# Patient Record
Sex: FEMALE | Race: BLACK OR AFRICAN AMERICAN | Employment: FULL TIME | URBAN - METROPOLITAN AREA
[De-identification: names, ages, dates, MRNs, and addresses within clinical notes are randomized per-mention and may not be internally consistent; named-entity substitution may affect disease eponyms.]

---

## 2019-02-19 ENCOUNTER — HOSPITAL ENCOUNTER (EMERGENCY)
Age: 24
Discharge: HOME | End: 2019-02-19
Attending: EMERGENCY MEDICINE
Payer: COMMERCIAL

## 2019-02-19 ENCOUNTER — APPOINTMENT (OUTPATIENT)
Dept: GENERAL RADIOLOGY | Age: 24
End: 2019-02-19
Payer: COMMERCIAL

## 2019-02-19 VITALS
HEIGHT: 67 IN | OXYGEN SATURATION: 100 % | HEART RATE: 113 BPM | DIASTOLIC BLOOD PRESSURE: 71 MMHG | WEIGHT: 200.6 LBS | BODY MASS INDEX: 31.48 KG/M2 | SYSTOLIC BLOOD PRESSURE: 142 MMHG | RESPIRATION RATE: 16 BRPM | TEMPERATURE: 100.2 F

## 2019-02-19 DIAGNOSIS — J10.1 INFLUENZA A: Primary | ICD-10-CM

## 2019-02-19 DIAGNOSIS — M79.10 MYALGIA: ICD-10-CM

## 2019-02-19 DIAGNOSIS — R05.9 COUGH: ICD-10-CM

## 2019-02-19 LAB
RAPID INFLUENZA  B AGN: NEGATIVE
RAPID INFLUENZA A AGN: POSITIVE
S PYO AG THROAT QL: NEGATIVE

## 2019-02-19 PROCEDURE — 71046 X-RAY EXAM CHEST 2 VIEWS: CPT

## 2019-02-19 PROCEDURE — 87081 CULTURE SCREEN ONLY: CPT

## 2019-02-19 PROCEDURE — 6370000000 HC RX 637 (ALT 250 FOR IP): Performed by: EMERGENCY MEDICINE

## 2019-02-19 PROCEDURE — 87880 STREP A ASSAY W/OPTIC: CPT

## 2019-02-19 PROCEDURE — 99283 EMERGENCY DEPT VISIT LOW MDM: CPT

## 2019-02-19 PROCEDURE — 87804 INFLUENZA ASSAY W/OPTIC: CPT

## 2019-02-19 RX ORDER — OSELTAMIVIR PHOSPHATE 75 MG/1
75 CAPSULE ORAL ONCE
Status: DISCONTINUED | OUTPATIENT
Start: 2019-02-19 | End: 2019-02-19 | Stop reason: HOSPADM

## 2019-02-19 RX ORDER — IBUPROFEN 600 MG/1
600 TABLET ORAL EVERY 6 HOURS PRN
Qty: 30 TABLET | Refills: 0 | Status: SHIPPED | OUTPATIENT
Start: 2019-02-19 | End: 2019-06-15 | Stop reason: ALTCHOICE

## 2019-02-19 RX ORDER — IBUPROFEN 400 MG/1
800 TABLET ORAL ONCE
Status: COMPLETED | OUTPATIENT
Start: 2019-02-19 | End: 2019-02-19

## 2019-02-19 RX ORDER — OSELTAMIVIR PHOSPHATE 75 MG/1
75 CAPSULE ORAL 2 TIMES DAILY
Qty: 10 CAPSULE | Refills: 0 | Status: SHIPPED | OUTPATIENT
Start: 2019-02-19 | End: 2019-02-24

## 2019-02-19 RX ADMIN — IBUPROFEN 800 MG: 400 TABLET ORAL at 09:41

## 2019-02-19 ASSESSMENT — PAIN DESCRIPTION - DESCRIPTORS: DESCRIPTORS: ACHING

## 2019-02-19 ASSESSMENT — PAIN SCALES - GENERAL: PAINLEVEL_OUTOF10: 5

## 2019-02-19 ASSESSMENT — PAIN DESCRIPTION - LOCATION: LOCATION: BACK

## 2019-02-21 ENCOUNTER — HOSPITAL ENCOUNTER (EMERGENCY)
Age: 24
Discharge: HOME | End: 2019-02-21
Attending: EMERGENCY MEDICINE
Payer: COMMERCIAL

## 2019-02-21 VITALS
TEMPERATURE: 98.5 F | WEIGHT: 201.4 LBS | HEART RATE: 93 BPM | RESPIRATION RATE: 16 BRPM | SYSTOLIC BLOOD PRESSURE: 131 MMHG | BODY MASS INDEX: 31.61 KG/M2 | DIASTOLIC BLOOD PRESSURE: 85 MMHG | OXYGEN SATURATION: 100 % | HEIGHT: 67 IN

## 2019-02-21 DIAGNOSIS — N34.2 URETHRITIS: Primary | ICD-10-CM

## 2019-02-21 DIAGNOSIS — R30.0 DYSURIA: ICD-10-CM

## 2019-02-21 LAB
BACTERIA: ABNORMAL /HPF
BILIRUBIN URINE: NEGATIVE
BLOOD, URINE: NEGATIVE
CLARITY: ABNORMAL
COLOR: YELLOW
EPITHELIAL CELLS, UA: ABNORMAL /HPF
GLUCOSE URINE: NEGATIVE MG/DL
HCG(URINE) PREGNANCY TEST: NEGATIVE
KETONES, URINE: NEGATIVE MG/DL
LEUKOCYTE ESTERASE, URINE: ABNORMAL
MICROSCOPIC EXAMINATION: YES
NITRITE, URINE: NEGATIVE
PH UA: 5.5
PROTEIN UA: NEGATIVE MG/DL
RBC UA: ABNORMAL /HPF (ref 0–2)
S PYO THROAT QL CULT: NORMAL
SPECIFIC GRAVITY UA: >=1.03
URINE TYPE: ABNORMAL
UROBILINOGEN, URINE: 1 E.U./DL
WBC UA: ABNORMAL /HPF (ref 0–5)
YEAST: PRESENT /HPF

## 2019-02-21 PROCEDURE — 81001 URINALYSIS AUTO W/SCOPE: CPT

## 2019-02-21 PROCEDURE — 99283 EMERGENCY DEPT VISIT LOW MDM: CPT

## 2019-02-21 PROCEDURE — 84703 CHORIONIC GONADOTROPIN ASSAY: CPT

## 2019-02-21 RX ORDER — NITROFURANTOIN 25; 75 MG/1; MG/1
100 CAPSULE ORAL 2 TIMES DAILY
Qty: 10 CAPSULE | Refills: 0 | Status: SHIPPED | OUTPATIENT
Start: 2019-02-21 | End: 2019-02-26

## 2019-02-21 ASSESSMENT — PAIN DESCRIPTION - FREQUENCY: FREQUENCY: CONTINUOUS

## 2019-02-21 ASSESSMENT — PAIN DESCRIPTION - PAIN TYPE: TYPE: ACUTE PAIN

## 2019-02-21 ASSESSMENT — PAIN SCALES - GENERAL: PAINLEVEL_OUTOF10: 7

## 2019-02-21 ASSESSMENT — PAIN DESCRIPTION - LOCATION: LOCATION: PELVIS

## 2019-02-21 ASSESSMENT — PAIN DESCRIPTION - DESCRIPTORS: DESCRIPTORS: ACHING;THROBBING

## 2019-06-09 ENCOUNTER — HOSPITAL ENCOUNTER (EMERGENCY)
Age: 24
Discharge: HOME | End: 2019-06-09
Attending: EMERGENCY MEDICINE
Payer: COMMERCIAL

## 2019-06-09 VITALS
DIASTOLIC BLOOD PRESSURE: 75 MMHG | TEMPERATURE: 98 F | RESPIRATION RATE: 12 BRPM | HEART RATE: 98 BPM | WEIGHT: 203.5 LBS | BODY MASS INDEX: 31.94 KG/M2 | OXYGEN SATURATION: 98 % | HEIGHT: 67 IN | SYSTOLIC BLOOD PRESSURE: 143 MMHG

## 2019-06-09 DIAGNOSIS — N30.00 ACUTE CYSTITIS WITHOUT HEMATURIA: Primary | ICD-10-CM

## 2019-06-09 LAB
AMORPHOUS: ABNORMAL /HPF
BACTERIA: ABNORMAL /HPF
BILIRUBIN URINE: NEGATIVE
BLOOD, URINE: ABNORMAL
CLARITY: ABNORMAL
COLOR: YELLOW
EPITHELIAL CELLS, UA: ABNORMAL /HPF
GLUCOSE URINE: NEGATIVE MG/DL
HCG(URINE) PREGNANCY TEST: NEGATIVE
KETONES, URINE: NEGATIVE MG/DL
LEUKOCYTE ESTERASE, URINE: ABNORMAL
MICROSCOPIC EXAMINATION: YES
MUCUS: ABNORMAL /LPF
NITRITE, URINE: NEGATIVE
PH UA: 7 (ref 5–8)
PROTEIN UA: 30 MG/DL
RBC UA: ABNORMAL /HPF (ref 0–2)
SPECIFIC GRAVITY UA: 1.02 (ref 1–1.03)
URINE TYPE: ABNORMAL
UROBILINOGEN, URINE: 0.2 E.U./DL
WBC UA: >100 /HPF (ref 0–5)

## 2019-06-09 PROCEDURE — 84703 CHORIONIC GONADOTROPIN ASSAY: CPT

## 2019-06-09 PROCEDURE — 81001 URINALYSIS AUTO W/SCOPE: CPT

## 2019-06-09 PROCEDURE — 99283 EMERGENCY DEPT VISIT LOW MDM: CPT

## 2019-06-09 PROCEDURE — 6370000000 HC RX 637 (ALT 250 FOR IP): Performed by: EMERGENCY MEDICINE

## 2019-06-09 RX ORDER — NITROFURANTOIN 25; 75 MG/1; MG/1
100 CAPSULE ORAL 2 TIMES DAILY
Qty: 10 CAPSULE | Refills: 0 | Status: SHIPPED | OUTPATIENT
Start: 2019-06-09 | End: 2019-06-12

## 2019-06-09 RX ORDER — PHENAZOPYRIDINE HYDROCHLORIDE 100 MG/1
100 TABLET, FILM COATED ORAL 3 TIMES DAILY PRN
Qty: 6 TABLET | Refills: 0 | Status: SHIPPED | OUTPATIENT
Start: 2019-06-09 | End: 2019-06-12

## 2019-06-09 RX ORDER — NITROFURANTOIN 25; 75 MG/1; MG/1
100 CAPSULE ORAL EVERY 12 HOURS SCHEDULED
Status: DISCONTINUED | OUTPATIENT
Start: 2019-06-09 | End: 2019-06-09 | Stop reason: HOSPADM

## 2019-06-09 RX ADMIN — NITROFURANTOIN MONOHYDRATE/MACROCRYSTALLINE 100 MG: 25; 75 CAPSULE ORAL at 01:53

## 2019-06-09 ASSESSMENT — PAIN DESCRIPTION - PAIN TYPE: TYPE: ACUTE PAIN

## 2019-06-09 ASSESSMENT — PAIN SCALES - GENERAL: PAINLEVEL_OUTOF10: 7

## 2019-06-09 ASSESSMENT — PAIN DESCRIPTION - FREQUENCY: FREQUENCY: CONTINUOUS

## 2019-06-09 ASSESSMENT — PAIN DESCRIPTION - DESCRIPTORS: DESCRIPTORS: THROBBING;ACHING

## 2019-06-09 ASSESSMENT — PAIN DESCRIPTION - LOCATION: LOCATION: BACK

## 2019-06-09 ASSESSMENT — PAIN DESCRIPTION - ONSET: ONSET: ON-GOING

## 2019-06-09 ASSESSMENT — PAIN DESCRIPTION - ORIENTATION: ORIENTATION: LOWER

## 2019-06-09 ASSESSMENT — PAIN DESCRIPTION - PROGRESSION: CLINICAL_PROGRESSION: NOT CHANGED

## 2019-06-09 NOTE — DISCHARGE INSTR - COC
Continuity of Care Form    Patient Name: Emily Garcia   :  1995  MRN:  [id number]    Admit date:  2019  Discharge date:  ***    Code Status Order: No Order   Advance Directives:     Admitting Physician:  No admitting provider for patient encounter. PCP: No primary care provider on file. Discharging Nurse: [facility] Unit/Room#: 15/15  Discharging Unit Phone Number: ***    Emergency Contact:   Extended Emergency Contact Information  Primary Emergency Contact: Jenny Marcus  Address: [address], [address] [place] [address] Phone: [phone]  Relation: Parent  Secondary Emergency Contact: Noone,Else  Relation: Other    Past Surgical History:  Past Surgical History:   Procedure Laterality Date     SECTION         Immunization History: There is no immunization history on file for this patient. Active Problems: There is no problem list on file for this patient. Isolation/Infection:   Isolation          No Isolation            Nurse Assessment:  Last Vital Signs: BP (!) 143/75   Pulse 98   Temp 98 °F (36.7 °C) (Oral)   Resp 12   Ht 5' 7\" (1.702 m)   Wt 92.3 kg (203 lb 8 oz)   LMP 2019 (Approximate)   SpO2 98%   Breastfeeding?  No   BMI 31.87 kg/m²     Last documented pain score (0-10 scale): Pain Level: 7  Last Weight:   Wt Readings from Last 1 Encounters:   19 92.3 kg (203 lb 8 oz)     Mental Status:  {IP PT MENTAL STATUS:30828}    IV Access:  { ARCHIE IV ACCESS:016624117}    Nursing Mobility/ADLs:  Walking   {CHP DME BWWB:694908724}  Transfer  {CHP DME TDGU:956425806}  Bathing  {CHP DME OEHZ:822657800}  Dressing  {CHP DME JIAH:354841472}  Toileting  {CHP DME MDDX:736585097}  Feeding  {CHP DME NVWJ:255385110}  Med Admin  {CHP DME QOVP:658686742}  Med Delivery   { ARCHIE MED Delivery:735322748}    Wound Care Documentation and Therapy:        Elimination:  Continence:   · Bowel: {YES / BZ:40776}  · Bladder: {YES / WV:07425}  Urinary Catheter: {Urinary Catheter:881048955}   Colostomy/Ileostomy/Ileal Conduit: {YES / CW:33834}       Date of Last BM: ***  No intake or output data in the 24 hours ending 19 0155  No intake/output data recorded.     Safety Concerns:     [address] [facility] Safety Concerns:082546488}    Impairments/Disabilities:      [facility] Impairments/Disabilities:222339430}    Nutrition Therapy:  Current Nutrition Therapy:   [facility] Diet List:096164585}    Routes of Feeding: {CHP DME Other Feedings:851484284}  Liquids: {Slp liquid thickness:18068}  Daily Fluid Restriction: {CHP DME Yes amt example:790510315}  Last Modified Barium Swallow with Video (Video Swallowing Test): {Done Not Done BQSA:576331160}    Treatments at the Time of Hospital Discharge:   Respiratory Treatments: ***  Oxygen Therapy:  {Therapy; copd oxygen:61128}  Ventilator:    { CC Vent IDDZ:652788392}    Rehab Therapies: {THERAPEUTIC INTERVENTION:1117368894}  Weight Bearing Status/Restrictions: [address] [place] [address] Weight Bearin}  Other Medical Equipment (for information only, NOT a DME order):  {EQUIPMENT:709267515}  Other Treatments: ***    Patient's personal belongings (please select all that are sent with patient):  {P DME Belongings:669594911}    RN SIGNATURE:  {Esignature:967867110}    CASE MANAGEMENT/SOCIAL WORK SECTION    Inpatient Status Date: ***    Readmission Risk Assessment Score:  Readmission Risk              Risk of Unplanned Readmission:        0           Discharging to Facility/ Agency   · Name:   · Address:  · Phone:  · Fax:    Dialysis Facility (if applicable)   · Name:  · Address:  · Dialysis Schedule:  · Phone:  · Fax:    / signature: {Esignature:154791617}    PHYSICIAN SECTION    Prognosis: {Prognosis:2977283222}    Condition at Discharge: [address] Patient Condition:484340766}    Rehab Potential (if transferring to Rehab): {Prognosis:5210874222}    Recommended Labs or Other

## 2019-06-09 NOTE — ED PROVIDER NOTES
[facility]   eMERGENCY dEPARTMENT eNCOUnter      Pt Name: Conchis Green  MRN: [id number]  [place] 1995  Date of evaluation: 2019  Provider: Levon Wang MD  PCP: No primary care provider on file. CHIEF COMPLAINT       Chief Complaint   Patient presents with    Back Pain     pt c/o lower back pain and pressure after urinating x 2 days, pt denies fevers at this time. HISTORY OFPRESENT ILLNESS   (Location/Symptom, Timing/Onset, Context/Setting, Quality, Duration, Modifying Factors,Severity)  Note limiting factors. Conchis Green is a 26 y.o. female  Presents with complaints of low back pain and he feet she feels pressure after she urinates is been going on for 2 days she denies any fever denies any vaginal discharge denies any history of kidney stones denies any nausea or vomiting she rates the pain a 7 out of 10. Nursing Notes were all reviewed and agreed with or any disagreements were addressed  in the HPI. REVIEW OF SYSTEMS    (2-9 systems for level 4, 10 or more for level 5)     Review of Systems    Positives and Pertinent negatives as per HPI. Except as noted above in the ROS, all other systems were reviewed andnegative. PASTMEDICAL HISTORY   History reviewed. No pertinent past medical history. SURGICAL HISTORY       Past Surgical History:   Procedure Laterality Date     SECTION           CURRENT MEDICATIONS       Previous Medications    CRANBERRY-VITAMIN C (AZO CRANBERRY URINARY TRACT PO)    Take by mouth    IBUPROFEN (ADVIL;MOTRIN) 600 MG TABLET    Take 1 tablet by mouth every 6 hours as needed for Pain       ALLERGIES     Patient has no known allergies. FAMILY HISTORY     History reviewed. No pertinent family history.        SOCIAL HISTORY       Social History     Socioeconomic History    Marital status: Single     Spouse name: None    Number of children: None    Years of education: None    Highest education level: None   Occupational History    None   Social Needs    Financial resource strain: None    Food insecurity:     Worry: None     Inability: None    Transportation needs:     Medical: None     Non-medical: None   Tobacco Use    Smoking status: Never Smoker    Smokeless tobacco: Never Used   Substance and Sexual Activity    Alcohol use: Yes     Comment: socially    Drug use: No    Sexual activity: Yes     Partners: Male   Lifestyle    Physical activity:     Days per week: None     Minutes per session: None    Stress: None   Relationships    Social connections:     Talks on phone: None     Gets together: None     Attends Holiness service: None     Active member of club or organization: None     Attends meetings of clubs or organizations: None     Relationship status: None    Intimate partner violence:     Fear of current or ex partner: None     Emotionally abused: None     Physically abused: None     Forced sexual activity: None   Other Topics Concern    None   Social History Narrative    None       SCREENINGS    [place] Coma Scale  Eye Opening: Spontaneous  Best Verbal Response: Oriented  Best Motor Response: Obeys commands  [place] Coma Scale Score: 15 @FLOW(34260961)@      PHYSICAL EXAM    (up to 7 for level 4, 8 or more for level 5)     ED Triage Vitals [06/09/19 0130]   BP Temp Temp Source Pulse Resp SpO2 Height Weight   (!) 143/75 98 °F (36.7 °C) Oral 98 12 98 % 5' 7\" (1.702 m) 203 lb 8 oz (92.3 kg)       Physical Exam      General Appearance:  Alert, cooperative, no distress, appears stated age. Head:  Normocephalic, without obviousabnormality, atraumatic. Eyes:  conjunctiva/corneas clear, EOM's intact. Sclera anicteric. ENT: Mucous membranes moist.   Neck: Supple, symmetrical, trachea midline, no adenopathy. No jugular venous distention. Lungs:   Clear to auscultation bilaterally, respirationsunlabored. No rales, rhonchi or wheezes. Chest Wall:  No tenderness. Heart:  Regular rate and rhythm, S1 and S2 normal, no murmur, rub or gallop. Abdomen:   Soft, non-tender, bowel sounds active,   no masses, no organomegaly. Extremities: No edema, cords or calf tenderness. Full range of motion. Pulses: 2+ and symmetric   Skin: Turgor is normal, no rashes or lesions. Neurologic: Alert and oriented X 3. No focal findings. Motor grossly normal.  Speech clear, no drift, CN III-XII grossly intact,        DIAGNOSTIC RESULTS   LABS:    Labs Reviewed   URINALYSIS - Abnormal; Notable for the following components:       Result Value    Clarity, UA CLOUDY (*)     Blood, Urine SMALL (*)     Protein, UA 30 (*)     Leukocyte Esterase, Urine MODERATE (*)     All other components within normal limits    Narrative:     Performed at:  [facility] [place],  [facility]   Phone [phone]   PREGNANCY, URINE    Narrative:     Performed at:  [facility],  [facility]   Phone [phone]   MICROSCOPIC URINALYSIS       All other labs were within normal range or not returned as of this dictation. EKG: All EKG's are interpreted by the Emergency Department Physician who eithersigns or Co-signs this chart in the absence of a cardiologist.        RADIOLOGY:   Non-plain film images such as CT, Ultrasound and MRI are read by the radiologist. Plain radiographic images are visualized by myself.       *    Interpretation per the Radiologist below, if available at the time of this note:    No orders to display         PROCEDURES   Unless otherwise noted below, none     Procedures    *    CRITICAL CARE TIME   N/A      EMERGENCY DEPARTMENT COURSE and DIFFERENTIALDIAGNOSIS/MDM:   Vitals:    Vitals:    06/09/19 0130   BP: (!) 143/75   Pulse: 98   Resp: 12   Temp: 98 °F (36.7 °C)   TempSrc: Oral   SpO2: 98%   Weight: 92.3 kg (203 lb 8 oz)   Height: 5' 7\" (1.702 m)       Patient was given thefollowing medications:  Medications   nitrofurantoin (macrocrystal-monohydrate) (MACROBID) capsule 100 mg (has no administration in time range)           The patient tolerated their visit well. The patient and / or the familywere informed of the results of any tests, a time was given to answer questions. FINAL IMPRESSION      1.  Acute cystitis without hematuria          DISPOSITION/PLAN   DISPOSITION Decision To Discharge 06/09/2019 01:38:26 AM      PATIENT REFERRED TO:  [facility] AT [address] [address] [phone]      As needed      DISCHARGE MEDICATIONS:  New Prescriptions    NITROFURANTOIN, MACROCRYSTAL-MONOHYDRATE, (MACROBID) 100 MG CAPSULE    Take 1 capsule by mouth 2 times daily for 5 days    PHENAZOPYRIDINE (PYRIDIUM) 100 MG TABLET    Take 1 tablet by mouth 3 times daily as needed for Pain       DISCONTINUED MEDICATIONS:  Discontinued Medications    No medications on file              (Please note that portions of this note were completed with a voice recognition program.  Efforts were made to edit the dictations but occasionally words are mis-transcribed.)    Yuan Jaffe MD (electronically signed)      Yuan Jaffe MD  06/09/19 0140

## 2019-06-12 ENCOUNTER — HOSPITAL ENCOUNTER (EMERGENCY)
Age: 24
Discharge: HOME | End: 2019-06-12
Attending: EMERGENCY MEDICINE
Payer: COMMERCIAL

## 2019-06-12 VITALS
BODY MASS INDEX: 31.81 KG/M2 | OXYGEN SATURATION: 99 % | TEMPERATURE: 98.9 F | HEART RATE: 92 BPM | DIASTOLIC BLOOD PRESSURE: 71 MMHG | SYSTOLIC BLOOD PRESSURE: 116 MMHG | RESPIRATION RATE: 14 BRPM | WEIGHT: 203.1 LBS

## 2019-06-12 DIAGNOSIS — N76.0 BACTERIAL VAGINOSIS: ICD-10-CM

## 2019-06-12 DIAGNOSIS — B96.89 BACTERIAL VAGINOSIS: ICD-10-CM

## 2019-06-12 DIAGNOSIS — N30.00 ACUTE CYSTITIS WITHOUT HEMATURIA: Primary | ICD-10-CM

## 2019-06-12 LAB
BACTERIA WET PREP: ABNORMAL
BACTERIA: ABNORMAL /HPF
BILIRUBIN URINE: NEGATIVE
BLOOD, URINE: NEGATIVE
CLARITY: ABNORMAL
CLUE CELLS: ABNORMAL
COLOR: YELLOW
EPITHELIAL CELLS WET PREP: ABNORMAL
EPITHELIAL CELLS, UA: 24 /HPF (ref 0–5)
GLUCOSE URINE: NEGATIVE MG/DL
HCG(URINE) PREGNANCY TEST: NEGATIVE
HYALINE CASTS: 5 /LPF (ref 0–8)
KETONES, URINE: NEGATIVE MG/DL
LEUKOCYTE ESTERASE, URINE: ABNORMAL
MICROSCOPIC EXAMINATION: YES
NITRITE, URINE: NEGATIVE
PH UA: 7.5 (ref 5–8)
PROTEIN UA: 30 MG/DL
RBC UA: ABNORMAL /HPF (ref 0–2)
RBC WET PREP: ABNORMAL
SOURCE WET PREP: ABNORMAL
SPECIFIC GRAVITY UA: 1.02 (ref 1–1.03)
TRICHOMONAS PREP: ABNORMAL
URINE TYPE: ABNORMAL
UROBILINOGEN, URINE: 1 E.U./DL
WBC UA: 101 /HPF (ref 0–5)
WBC WET PREP: ABNORMAL
YEAST WET PREP: ABNORMAL

## 2019-06-12 PROCEDURE — 81001 URINALYSIS AUTO W/SCOPE: CPT

## 2019-06-12 PROCEDURE — 87210 SMEAR WET MOUNT SALINE/INK: CPT

## 2019-06-12 PROCEDURE — 87491 CHLMYD TRACH DNA AMP PROBE: CPT

## 2019-06-12 PROCEDURE — 87086 URINE CULTURE/COLONY COUNT: CPT

## 2019-06-12 PROCEDURE — 87591 N.GONORRHOEAE DNA AMP PROB: CPT

## 2019-06-12 PROCEDURE — 84703 CHORIONIC GONADOTROPIN ASSAY: CPT

## 2019-06-12 PROCEDURE — 99283 EMERGENCY DEPT VISIT LOW MDM: CPT

## 2019-06-12 PROCEDURE — 87077 CULTURE AEROBIC IDENTIFY: CPT

## 2019-06-12 RX ORDER — METRONIDAZOLE 7.5 MG/G
1 GEL VAGINAL DAILY
Qty: 70 G | Refills: 0 | Status: SHIPPED | OUTPATIENT
Start: 2019-06-12 | End: 2019-06-19

## 2019-06-12 RX ORDER — CEFUROXIME AXETIL 250 MG/1
500 TABLET ORAL 2 TIMES DAILY
Qty: 40 TABLET | Refills: 0 | Status: SHIPPED | OUTPATIENT
Start: 2019-06-12 | End: 2019-06-15 | Stop reason: ALTCHOICE

## 2019-06-12 RX ORDER — METRONIDAZOLE 500 MG/1
500 TABLET ORAL 2 TIMES DAILY
Qty: 14 TABLET | Refills: 0 | Status: SHIPPED | OUTPATIENT
Start: 2019-06-12 | End: 2019-06-12

## 2019-06-12 ASSESSMENT — ENCOUNTER SYMPTOMS
STRIDOR: 0
NAUSEA: 0
COUGH: 0
WHEEZING: 0
ABDOMINAL PAIN: 0
SHORTNESS OF BREATH: 0
VOMITING: 0
BACK PAIN: 0
DIARRHEA: 0
CONSTIPATION: 0
ABDOMINAL DISTENTION: 0
BLOOD IN STOOL: 0
ANAL BLEEDING: 0
COLOR CHANGE: 0
RECTAL PAIN: 0

## 2019-06-12 NOTE — ED PROVIDER NOTES
[facility]        Pt Name: Quintin Domingo  MRN: [id number]  [place] 1995  Date of evaluation: 6/12/2019  Provider: Prabhjot Hawkins PA-C  PCP: No primary care provider on file. This patient was seen and evaluated by the attending physician Nahed Johnson MD.      Davi Lexie       Chief Complaint   Patient presents with    Urinary Tract Infection     burning while urinating with odor afterwards. recently dx with UTI and prescribed medications without improvement       HISTORY OF PRESENT ILLNESS   (Location/Symptom, Timing/Onset, Context/Setting, Quality, Duration, Modifying Factors, Severity)  Note limiting factors. Quintin Domingo is a 26 y.o. female who presents to the emergency department complaining of dysuria, urinary frequency, urgency and foul-smelling vaginal discharge for several days. She went to another emergency department on 6/9/2019 and was diagnosed with UTI. She was placed on Macrobid and Pyridium and states that her symptoms are not getting any better. Denies any abdominal or pelvic pain. Denies likely had a pregnancy. Denies fever, chills, flank pain, hematuria, nausea, vomiting, diarrhea, constipation. She is not terribly concerned about STD. However, has history of bacterial vaginosis and states that this does feel somewhat similar. Nursing Notes were all reviewed and agreed with or any disagreements were addressed  in the HPI. REVIEW OF SYSTEMS    (2-9 systems for level 4, 10 or more for level 5)     Review of Systems   Constitutional: Negative for chills and fever. HENT: Negative. Eyes: Negative for visual disturbance. Respiratory: Negative for cough, shortness of breath, wheezing and stridor. Cardiovascular: Negative for chest pain, palpitations and leg swelling.    Gastrointestinal: Negative for abdominal distention, abdominal pain, anal bleeding, blood in stool, constipation, diarrhea, nausea, rectal pain and vomiting. Genitourinary: Positive for dysuria, frequency, urgency and vaginal discharge. Negative for decreased urine volume, difficulty urinating, flank pain, hematuria, menstrual problem, pelvic pain, vaginal bleeding and vaginal pain. Musculoskeletal: Negative for back pain, neck pain and neck stiffness. Skin: Negative for color change, pallor, rash and wound. Neurological: Negative for dizziness, tremors, seizures, syncope, facial asymmetry, speech difficulty, weakness, light-headedness, numbness and headaches. Psychiatric/Behavioral: Negative for confusion. All other systems reviewed and are negative. Positives and Pertinent negatives as per HPI. Except as noted abovein the ROS, all other systems were reviewed and negative. PAST MEDICAL HISTORY   History reviewed. No pertinent past medical history. SURGICAL HISTORY     Past Surgical History:   Procedure Laterality Date     SECTION           CURRENTMEDICATIONS       Previous Medications    CRANBERRY-VITAMIN C (AZO CRANBERRY URINARY TRACT PO)    Take by mouth    IBUPROFEN (ADVIL;MOTRIN) 600 MG TABLET    Take 1 tablet by mouth every 6 hours as needed for Pain    PHENAZOPYRIDINE (PYRIDIUM) 100 MG TABLET    Take 1 tablet by mouth 3 times daily as needed for Pain         ALLERGIES     Patient has no known allergies. FAMILYHISTORY     History reviewed. No pertinent family history.        SOCIAL HISTORY       Social History     Socioeconomic History    Marital status: Single     Spouse name: None    Number of children: None    Years of education: None    Highest education level: None   Occupational History    None   Social Needs    Financial resource strain: None    Food insecurity:     Worry: None     Inability: None    Transportation needs:     Medical: None     Non-medical: None   Tobacco Use    Smoking status: Never Smoker    Smokeless tobacco: Never Used   Substance and Sexual Activity    Alcohol use: Yes     Comment: socially    Drug use: No    Sexual activity: Yes     Partners: Male   Lifestyle    Physical activity:     Days per week: None     Minutes per session: None    Stress: None   Relationships    Social connections:     Talks on phone: None     Gets together: None     Attends Adventist service: None     Active member of club or organization: None     Attends meetings of clubs or organizations: None     Relationship status: None    Intimate partner violence:     Fear of current or ex partner: None     Emotionally abused: None     Physically abused: None     Forced sexual activity: None   Other Topics Concern    None   Social History Narrative    None       SCREENINGS             PHYSICAL EXAM    (up to 7 for level 4, 8 or more for level 5)     ED Triage Vitals [06/12/19 1727]   BP Temp Temp Source Pulse Resp SpO2 Height Weight   116/71 98.9 °F (37.2 °C) Infrared 92 14 99 % -- 203 lb 1.6 oz (92.1 kg)       Physical Exam   Constitutional: She is oriented to person, place, and time. She appears well-developed and well-nourished. No distress. HENT:   Head: Normocephalic and atraumatic. Right Ear: External ear normal.   Left Ear: External ear normal.   Nose: Nose normal.   Mouth/Throat: Oropharynx is clear and moist.   Eyes: Pupils are equal, round, and reactive to light. Conjunctivae and EOM are normal. Right eye exhibits no discharge. Left eye exhibits no discharge. Neck: Normal range of motion. No JVD present. Cardiovascular: Normal rate. Pulmonary/Chest: Effort normal and breath sounds normal.   Abdominal: Soft. Bowel sounds are normal. She exhibits no abdominal bruit and no pulsatile midline mass. There is no tenderness. There is no rigidity, no rebound, no guarding, no CVA tenderness, no tenderness at McBurney's point and negative Finch's sign. Genitourinary:   Genitourinary Comments: Prefers self swab   Musculoskeletal: Normal range of motion. Neurological: She is alert and oriented to person, place, and time. Skin: Skin is warm and dry. Capillary refill takes less than 2 seconds. No rash noted. She is not diaphoretic. No erythema. No pallor. Psychiatric: She has a normal mood and affect. Her behavior is normal.   Nursing note and vitals reviewed. DIAGNOSTIC RESULTS   LABS:    Labs Reviewed   WET PREP, GENITAL - Abnormal; Notable for the following components:       Result Value    Clue Cells, Wet Prep 1+ (*)     All other components within normal limits    Narrative:     Performed at:  [facility] [address], [facility], [address]   Phone [phone]   URINALYSIS - Abnormal; Notable for the following components:    Clarity, UA CLOUDY (*)     Protein, UA 30 (*)     Leukocyte Esterase, Urine MODERATE (*)     All other components within normal limits    Narrative:     Performed at:  [facility] [address], [facility], [facility] [address]   Phone [phone]   MICROSCOPIC URINALYSIS - Abnormal; Notable for the following components:    Bacteria, UA 3+ (*)     WBC,  (*)     Epi Cells 24 (*)     All other components within normal limits    Narrative:     Performed at:  [facility] [address], [facility], [facility] [address]   Phone [phone]   URINE CULTURE   C.TRACHOMATIS N.GONORRHOEAE DNA   PREGNANCY, URINE    Narrative:     Performed at:  [facility] [address], [facility], [facility] [address]   Phone [phone]       All other labs were within normal range or not returned as of this dictation. EKG: All EKG's are interpreted by the Emergency Department Physician who either signs orCo-signs this chart in the absence of a cardiologist.  Please see their note for interpretation of EKG.       RADIOLOGY:   Non-plain film images such as CT, Ultrasound and MRI are read by the radiologist. Plain radiographic images are visualized andpreliminarily interpreted by the  ED Provider with the below findings:        Interpretation perthe Radiologist below, if available at the time of this note:    No orders to display     No results found. PROCEDURES   Unless otherwise noted below, none     Procedures    CRITICAL CARE TIME   N/A    CONSULTS:  None      EMERGENCY DEPARTMENT COURSE and DIFFERENTIALDIAGNOSIS/MDM:   Vitals:    Vitals:    06/12/19 1727   BP: 116/71   Pulse: 92   Resp: 14   Temp: 98.9 °F (37.2 °C)   TempSrc: Infrared   SpO2: 99%   Weight: 203 lb 1.6 oz (92.1 kg)       Patient was given thefollowing medications:  Medications - No data to display    This patient presents complaining of acute urinary symptoms and vaginal discharge. She declines pelvic exam and prefers self swab. Abdomen soft nontender in all 4 quadrants without pulsatile mass or CVA tenderness. Wet prep shows evidence of bacterial vaginosis. Declines STD treatment at this time. Urinalysis suggest infections. Pregnancy test negative. We will change the antibiotic, Macrobid, to Ceftin and add Flagyl (she prefers topical ) to treat BV. Advised to follow-up with PCP and gynecologist for recheck and may return to ED per discharge instructions. She was given strict return precautions. She was counseled on safe sex for at least 3 minutes. My suspicion is low for acute surgical abdomen, obstruction, perforation, abscess, mesenteric ischemia, AAA, dissection, cholecystitis, cholangitis, pancreatitis, appendicitis, C. diff colitis, diverticulitis, volvulus, incarcerated hernia, necrotizing fasciitis, TOA, ovarian torsion, PID, ectopic pregnancy, angeline beena Porfirio syndrome,  pyelonephritis, perinephric abscess, kidney stone, urosepsis, fistula or other concerning pathology. We have addressed her concerns and expectations. FINAL IMPRESSION      1. Acute cystitis without hematuria    2.  Bacterial vaginosis          DISPOSITION/PLAN   DISPOSITION Decision To Discharge 06/12/2019 06:10:53 PM      PATIENT REFERREDTO:  [facility] Pre-Services  [place] [address] Emergency Department  [address] [facility]  [phone]    If symptoms worsen    [address]  [address] [facility] [address] [facility] [address] [facility]          DISCHARGE MEDICATIONS:  New Prescriptions    CEFUROXIME (CEFTIN) 250 MG TABLET    Take 2 tablets by mouth 2 times daily for 10 days    METRONIDAZOLE (METROGEL) 0.75 % VAGINAL GEL    Place 1 Applicatorful vaginally daily for 7 days       DISCONTINUED MEDICATIONS:  Discontinued Medications    NITROFURANTOIN, MACROCRYSTAL-MONOHYDRATE, (MACROBID) 100 MG CAPSULE    Take 1 capsule by mouth 2 times daily for 5 days              (Please note that portions ofthis note were completed with a voice recognition program.  Efforts were made to edit the dictations but occasionally words are mis-transcribed.)    Birdie Hernandez PA-C (electronically signed)           Birdie Hernandez PA-C  06/12/19 [address] [place], [place] [address]DALIA  06/12/19 Precious Maldonado

## 2019-06-12 NOTE — LETTER
[facility] Emergency Department  [address] [facility] [address]  Phone: [phone]               June 12, 2019    Patient: Isiah Amaro   YOB: 1995   Date of Visit: 6/12/2019       To Whom It May Concern:    Millicent Escoto was seen and treated in our emergency department on 6/12/2019. She may return to work on 6/15/2019.       Sincerely,       Cayla Mensah RN         Signature:__________________________________

## 2019-06-12 NOTE — ED PROVIDER NOTES
This patient was seen by the Mid-Level Provider. I have seen and examined the patient, agree with the workup, evaluation, management and diagnosis. Care plan has been discussed. My assessment reveals a 52-year-old female with painful urination. Patient presents with some painful urination. She also complains of some vaginal discharge and odor. Exam: Examination was unremarkable. Her abdomen is soft and benign. Work-up consistent with a urinalysis that is positive for infection and bacterial vaginosis. Disposition: She will be discharged with a full course of antibiotics and Flagyl for her bacterial vaginosis. She was given referral.  She is to return if worse. Results for orders placed or performed during the hospital encounter of 06/12/19   Wet prep, genital   Result Value Ref Range    Trichomonas Prep None Seen     Yeast, Wet Prep None Seen     Clue Cells, Wet Prep 1+ (A)     WBC, Wet Prep None Seen     RBC, Wet Prep None Seen     Epi Cells 3+     Bacteria 4+     Source Wet Prep Vaginal    Urinalysis   Result Value Ref Range    Color, UA YELLOW Straw/Yellow    Clarity, UA CLOUDY (A) Clear    Glucose, Ur Negative Negative mg/dL    Bilirubin Urine Negative Negative    Ketones, Urine Negative Negative mg/dL    Specific Gravity, UA 1.024 1.005 - 1.030    Blood, Urine Negative Negative    pH, UA 7.5 5.0 - 8.0    Protein, UA 30 (A) Negative mg/dL    Urobilinogen, Urine 1.0 <2.0 E.U./dL    Nitrite, Urine Negative Negative    Leukocyte Esterase, Urine MODERATE (A) Negative    Microscopic Examination YES     Urine Type Not Specified    Pregnancy, Urine   Result Value Ref Range    HCG(Urine) Pregnancy Test Negative Detects HCG level >20 MIU/mL   Microscopic Urinalysis   Result Value Ref Range    RBC, UA None seen 0 - 2 /HPF    Bacteria, UA 3+ (A) /HPF    Hyaline Casts, UA 5 0 - 8 /LPF    WBC,  (H) 0 - 5 /HPF    Epi Cells 24 (H) 0 - 5 /HPF     No results found.         Laine Rosen MD  06/12/19 [address] [place]

## 2019-06-13 LAB
C TRACH DNA GENITAL QL NAA+PROBE: NEGATIVE
N. GONORRHOEAE DNA: NEGATIVE

## 2019-06-14 LAB
ORGANISM: ABNORMAL
URINE CULTURE, ROUTINE: ABNORMAL
URINE CULTURE, ROUTINE: ABNORMAL

## 2019-06-15 ENCOUNTER — HOSPITAL ENCOUNTER (EMERGENCY)
Age: 24
Discharge: HOME | End: 2019-06-15
Attending: EMERGENCY MEDICINE
Payer: COMMERCIAL

## 2019-06-15 VITALS
OXYGEN SATURATION: 100 % | RESPIRATION RATE: 16 BRPM | TEMPERATURE: 99 F | DIASTOLIC BLOOD PRESSURE: 81 MMHG | HEART RATE: 81 BPM | WEIGHT: 203 LBS | BODY MASS INDEX: 31.79 KG/M2 | SYSTOLIC BLOOD PRESSURE: 134 MMHG

## 2019-06-15 DIAGNOSIS — N30.00 ACUTE CYSTITIS WITHOUT HEMATURIA: Primary | ICD-10-CM

## 2019-06-15 LAB
AMORPHOUS: ABNORMAL /HPF
BACTERIA: ABNORMAL /HPF
BILIRUBIN URINE: NEGATIVE
BLOOD, URINE: ABNORMAL
CLARITY: ABNORMAL
COLOR: YELLOW
EPITHELIAL CELLS, UA: ABNORMAL /HPF
GLUCOSE URINE: NEGATIVE MG/DL
KETONES, URINE: NEGATIVE MG/DL
LEUKOCYTE ESTERASE, URINE: ABNORMAL
MICROSCOPIC EXAMINATION: YES
MUCUS: ABNORMAL /LPF
NITRITE, URINE: NEGATIVE
PH UA: 7.5 (ref 5–8)
PROTEIN UA: 30 MG/DL
RAPID INFLUENZA  B AGN: NEGATIVE
RAPID INFLUENZA A AGN: NEGATIVE
RBC UA: ABNORMAL /HPF (ref 0–2)
SPECIFIC GRAVITY UA: 1.02 (ref 1–1.03)
URINE TYPE: ABNORMAL
UROBILINOGEN, URINE: 1 E.U./DL
WBC UA: >100 /HPF (ref 0–5)

## 2019-06-15 PROCEDURE — 87086 URINE CULTURE/COLONY COUNT: CPT

## 2019-06-15 PROCEDURE — 87804 INFLUENZA ASSAY W/OPTIC: CPT

## 2019-06-15 PROCEDURE — 81001 URINALYSIS AUTO W/SCOPE: CPT

## 2019-06-15 PROCEDURE — 99283 EMERGENCY DEPT VISIT LOW MDM: CPT

## 2019-06-15 RX ORDER — LEVOFLOXACIN 750 MG/1
750 TABLET ORAL DAILY
Qty: 5 TABLET | Refills: 0 | Status: SHIPPED | OUTPATIENT
Start: 2019-06-15 | End: 2019-06-20

## 2019-06-15 RX ORDER — DICLOFENAC SODIUM 75 MG/1
75 TABLET, DELAYED RELEASE ORAL 2 TIMES DAILY PRN
Qty: 10 TABLET | Refills: 0 | Status: SHIPPED | OUTPATIENT
Start: 2019-06-15 | End: 2021-01-08

## 2019-06-15 ASSESSMENT — PAIN SCALES - GENERAL: PAINLEVEL_OUTOF10: 8

## 2019-06-15 ASSESSMENT — PAIN DESCRIPTION - LOCATION: LOCATION: GENERALIZED

## 2019-06-15 ASSESSMENT — PAIN DESCRIPTION - PAIN TYPE: TYPE: ACUTE PAIN

## 2019-06-15 ASSESSMENT — PAIN DESCRIPTION - DESCRIPTORS: DESCRIPTORS: ACHING

## 2019-06-16 NOTE — ED PROVIDER NOTES
Emergency Department Provider Note  Location: [address] [place] [address]  6/15/2019     Patient Identification  Isiah Amaro is a 26 y.o. female    Chief Complaint  Fever (c/o Generalized Aches with Fever that started about 2 hours ago. Slight Cough tonight 30 min ago. Denies being around anyone who has been ill. )      Mode of Arrival  private car    HPI  (History provided by patient)  This is a 26 y.o. female without relevant past medical history presented today for subjective fever, generalized body ache that started 2 days ago. Patient is currently taking Ceftin for UTI that was diagnosed 3 days ago. Patient also states she does not feel better. She states she continues had dysuria and felt like her dysuria has gotten worse. She denies flank pain. No nausea or vomiting. She also developed a cough tonight. She started feeling some congestion as well. Denies sick contact. No foreign travel. She has not taken any medication for symptoms. No nausea, vomiting, or diarrhea. Patient states she feels like having a flu. She was diagnosed with flu in February of this year. She is requesting a flu swab because she has a 11 month old. ROS  5 systems reviewed, pertinent positives per HPI otherwise noted to be negative    I have reviewed the following nursing documentation:  Allergies: No Known Allergies    Past medical history:  has no past medical history on file. Past surgical history:  has a past surgical history that includes  section. Home medications:   Prior to Admission medications    Medication Sig Start Date End Date Taking? Authorizing Provider   metroNIDAZOLE (METROGEL) 0.75 % vaginal gel Place 1 Applicatorful vaginally daily for 7 days 19 Yes Jamaal Wilson PA-C   Cranberry-Vitamin C (AZO CRANBERRY URINARY TRACT PO) Take by mouth   Yes Historical Provider, MD       Social history:  reports that she has never smoked.  She has never used smokeless tobacco. She reports that she drinks alcohol. She reports that she does not use drugs. Family history:  History reviewed. No pertinent family history. Exam  ED Triage Vitals [06/15/19 2228]   BP Temp Temp Source Pulse Resp SpO2 Height Weight   134/81 99 °F (37.2 °C) Oral 81 16 100 % -- 203 lb (92.1 kg)   Nursing note and vital signs reviewed. Constitutional: Patient is oriented to person, place, and time. WDWN. No distress. Nontoxic. HENT:      Head: Normocephalic and atraumatic. Ears: External ears normal. TM normal bilaterally. Nose: Nose normal.     Mouth: Membrane mucosa moist and pink. Uvula midline. Soft palate symmetrical.  No evidence of PTA. No tonsillar exudate. No trismus. No submandibular fullness or tongue elevation. Eyes: Anicteric sclera. PERRL. EOMI. No discharge. Neck: Supple. Trachea midline. Good ROM. No meningismus signs. No mass. Lymph: No cervical adenopathy  Cardiovascular: RRR, no g/r/m. 2+ radial pulses. Pulmonary/Chest: Effort normal. No respiratory distress. CTAB. No stridor. No wheezes. No rales. Musculoskeletal: No extremity edema. Compartments soft. No deformity. Neurological: Alert and oriented to person, place, and time. No facial droop. No slurred speech. Normal gait. Skin: Warm and dry. No rash. No petechiae.         MDM/ED Course  Labs  Results for orders placed or performed during the hospital encounter of 06/15/19   Rapid Flu Swab   Result Value Ref Range    Rapid Influenza A Ag Negative Negative    Rapid Influenza B Ag Negative Negative   Urinalysis, reflex to microscopic   Result Value Ref Range    Color, UA Yellow Straw/Yellow    Clarity, UA SL CLOUDY (A) Clear    Glucose, Ur Negative Negative mg/dL    Bilirubin Urine Negative Negative    Ketones, Urine Negative Negative mg/dL    Specific Gravity, UA 1.020 1.005 - 1.030    Blood, Urine TRACE-INTACT (A) Negative    pH, UA 7.5 5.0 - 8.0    Protein, UA 30 (A) Negative mg/dL Urobilinogen, Urine 1.0 <2.0 E.U./dL    Nitrite, Urine Negative Negative    Leukocyte Esterase, Urine MODERATE (A) Negative    Microscopic Examination YES     Urine Type Not Specified    Microscopic Urinalysis   Result Value Ref Range    Mucus, UA 1+ (A) /LPF    WBC, UA >100 (A) 0 - 5 /HPF    RBC, UA 0-2 0 - 2 /HPF    Epi Cells 10-20 /HPF    Bacteria, UA 2+ (A) /HPF    Amorphous, UA 2+ (A) /HPF       - Patient seen and evaluated in room 1.  26 y.o. female presented for subjective fever and body ache that started 2 hours ago. She is currently being treated for acute cystitis but reports worsening symptoms so we did agreed to repeat her urinalysis. Her UA shows show persistent UTI despite being on Ceftin for 2 full days now. No influenza. Patient otherwise was very well-appearing without signs of toxicity. We agreed to switch her antibiotics for UTI. She states she has had Macrobid before and it also does not help her UTI. I review her culture from 3 days ago. It grew Staphylococcus saprophyticus and sensitivity not tested due to no known resistance. - Diagnostic studies reviewed and results discussed with patient. We agreed to switch her antibiotics to Levaquin.  - Return precautions also discussed. patient verbalized understanding of care plan and agreed to follow-up with PCP as advised. I estimate there is LOW risk for EPIGLOTTITIS, PNEUMONIA, MENINGITIS, OR URINARY TRACT INFECTION, thus I consider the discharge disposition reasonable. Also, there is no evidence or peritonitis, sepsis, or toxicity. Jabari Lau and I have discussed the diagnosis and risks, and we agree with discharging home to follow-up with their primary doctor. We also discussed returning to the Emergency Department immediately if new or worsening symptoms occur.  We have discussed the symptoms which are most concerning (e.g., changing or worsening pain, trouble swallowing or breating, neck stiffness, fever) that necessitate immediate return. Clinical Impression:  1. Acute cystitis without hematuria        Disposition:  Discharge to home in good condition. Blood pressure 134/81, pulse 81, temperature 99 °F (37.2 °C), temperature source Oral, resp. rate 16, weight 92.1 kg (203 lb), last menstrual period 05/20/2019, SpO2 100 %, not currently breastfeeding. Patient was given scripts for the following medications. I counseled patient how to take these medications. Discharge Medication List as of 6/15/2019 11:15 PM      START taking these medications    Details   levofloxacin (LEVAQUIN) 750 MG tablet Take 1 tablet by mouth daily for 5 days, Disp-5 tablet, R-0Print      diclofenac (VOLTAREN) 75 MG EC tablet Take 1 tablet by mouth 2 times daily as needed for Pain, Disp-10 tablet, R-0Print             Disposition referral (if applicable):  [facility] AT [address]    Schedule an appointment as soon as possible for a visit   This is our primary care clinic    This chart was generated in part by using Dragon Dictation system and may contain errors related to that system including errors in grammar, punctuation, and spelling, as well as words and phrases that may be inappropriate. If there are any questions or concerns please feel free to contact the dictating provider for clarification.      Masoud Gonzalez MD  [facility] Karmen Duran MD  06/16/19 3429

## 2019-06-17 LAB — URINE CULTURE, ROUTINE: NORMAL

## 2019-07-29 ENCOUNTER — HOSPITAL ENCOUNTER (EMERGENCY)
Age: 24
Discharge: HOME | End: 2019-07-29
Attending: EMERGENCY MEDICINE
Payer: COMMERCIAL

## 2019-07-29 VITALS
OXYGEN SATURATION: 100 % | TEMPERATURE: 97.3 F | HEIGHT: 67 IN | WEIGHT: 198.44 LBS | RESPIRATION RATE: 14 BRPM | DIASTOLIC BLOOD PRESSURE: 84 MMHG | BODY MASS INDEX: 31.15 KG/M2 | SYSTOLIC BLOOD PRESSURE: 132 MMHG | HEART RATE: 94 BPM

## 2019-07-29 DIAGNOSIS — R11.2 NON-INTRACTABLE VOMITING WITH NAUSEA, UNSPECIFIED VOMITING TYPE: Primary | ICD-10-CM

## 2019-07-29 PROCEDURE — 6370000000 HC RX 637 (ALT 250 FOR IP): Performed by: EMERGENCY MEDICINE

## 2019-07-29 PROCEDURE — 99283 EMERGENCY DEPT VISIT LOW MDM: CPT

## 2019-07-29 RX ORDER — ONDANSETRON 4 MG/1
4 TABLET, ORALLY DISINTEGRATING ORAL EVERY 8 HOURS PRN
Qty: 12 TABLET | Refills: 1 | Status: SHIPPED | OUTPATIENT
Start: 2019-07-29 | End: 2021-01-08

## 2019-07-29 RX ORDER — ONDANSETRON 4 MG/1
4 TABLET, ORALLY DISINTEGRATING ORAL ONCE
Status: COMPLETED | OUTPATIENT
Start: 2019-07-29 | End: 2019-07-29

## 2019-07-29 RX ADMIN — ONDANSETRON 4 MG: 4 TABLET, ORALLY DISINTEGRATING ORAL at 13:20

## 2019-07-29 NOTE — ED PROVIDER NOTES
TRIAGE CHIEF COMPLAINT:   Chief Complaint   Patient presents with    Emesis         HPI: Candy Moya is a 22 y.o. female who presents to the Emergency Department with complaint of nausea and vomiting that started yesterday. She last vomited early this morning. She states she is feeling hungry. Denies diarrhea or abdominal pain. No fever or chills. She has no UTI symptoms or flank pain. Denies cough or URI symptoms. No sore throat or earache. She states her child has recently had some diarrhea. REVIEW OF SYSTEMS:  6 systems reviewed. Pertinent positives per HPI. Otherwise noted to be negative. Nursing notes reviewed and agree with above. Past medical/surgical history reviewed. MEDICATIONS   Patient's Medications   New Prescriptions    ONDANSETRON (ZOFRAN ODT) 4 MG DISINTEGRATING TABLET    Take 1 tablet by mouth every 8 hours as needed for Nausea or Vomiting May Sub regular tablet (non-ODT) if insurance does not cover ODT. Previous Medications    CRANBERRY-VITAMIN C (AZO CRANBERRY URINARY TRACT PO)    Take by mouth    DICLOFENAC (VOLTAREN) 75 MG EC TABLET    Take 1 tablet by mouth 2 times daily as needed for Pain   Modified Medications    No medications on file   Discontinued Medications    No medications on file         ALLERGIES No Known Allergies      /84   Pulse 94   Temp 97.3 °F (36.3 °C)   Resp 14   Ht 5' 7\" (1.702 m)   Wt 90 kg (198 lb 7 oz)   SpO2 100%   BMI 31.08 kg/m²   General:  No acute distress. Non toxic appearance  Head:   Normocephalic and atraumatic  Eyes:   Conjunctiva clear, JOAQUIN, EOM's intact. Sclera anicteric. ENT:   Mucous membranes moist  Neck:   Supple. No adenopathy or jugular venous distension  Lungs/Chest:  No respiratory distress  CVS:   Regular rate and rhythm  Abdomen: Bowel sounds normal.  Soft and nontender. Extremities:  Full range of motion  Skin:   No rashes or lesions to exposed skin  Back:   No CVA tenderness. Neuro:  Alert and OX3.  Speech clear and appropriate. No focal weakness. Normal sensation in all extremities. Gait normal.  Psych:   Affect normal. Mood normal        RADIOLOGY:      LAB      ED COURSE / MDM:  45-year-old female with nausea and vomiting since yesterday. She is feeling better today but is still nauseated. Clinically she is well-hydrated and nontoxic. Abdomen is benign. Likely this is viral in origin. No clinical evidence of a surgical abdomen, bowel obstruction, pancreatitis, pyelonephritis or ureteral colic. She was given a prescription for Zofran. I recommended a clear liquid diet and increase fluids. I discussed with Freda Delong the results of evaluation in the Emergency Department, diagnosis, care and prognosis. The plan is to discharge to home. The patient is in agreement with the plan and questions have been answered. I also discussed with the patient and/or family the reasons which may require a return visit and the importance of follow-up care.        (Please note that portions of this note may have been completed with a voice recognition program.  Efforts were made to edit the dictation but occasionally words are mis-transcribed)        FINAL IMPRESSION:  1 --nausea and vomiting                     Anabella Sal MD  07/29/19 3579

## 2019-07-29 NOTE — ED NOTES
Patient given prescription, work note, discharge instructions verbal and written, patient verbalized understanding. Alert/oriented X4, Clear speech.   Patient exhibits no distress, ambulates with steady gait per self leaving unit, no further request.     Kylah Parker RN  07/29/19 2436

## 2019-09-18 ENCOUNTER — APPOINTMENT (OUTPATIENT)
Dept: GENERAL RADIOLOGY | Age: 24
End: 2019-09-18
Payer: COMMERCIAL

## 2019-09-18 ENCOUNTER — HOSPITAL ENCOUNTER (EMERGENCY)
Age: 24
Discharge: HOME | End: 2019-09-18
Payer: COMMERCIAL

## 2019-09-18 VITALS
SYSTOLIC BLOOD PRESSURE: 141 MMHG | DIASTOLIC BLOOD PRESSURE: 78 MMHG | RESPIRATION RATE: 16 BRPM | OXYGEN SATURATION: 100 % | TEMPERATURE: 98.4 F | HEART RATE: 98 BPM

## 2019-09-18 DIAGNOSIS — S93.402A SPRAIN OF LEFT ANKLE, UNSPECIFIED LIGAMENT, INITIAL ENCOUNTER: Primary | ICD-10-CM

## 2019-09-18 PROCEDURE — 73610 X-RAY EXAM OF ANKLE: CPT

## 2019-09-18 PROCEDURE — 99283 EMERGENCY DEPT VISIT LOW MDM: CPT

## 2019-09-18 RX ORDER — IBUPROFEN 600 MG/1
600 TABLET ORAL EVERY 6 HOURS PRN
Qty: 30 TABLET | Refills: 0 | Status: SHIPPED | OUTPATIENT
Start: 2019-09-18 | End: 2021-08-06

## 2019-09-18 ASSESSMENT — PAIN - FUNCTIONAL ASSESSMENT
PAIN_FUNCTIONAL_ASSESSMENT: 0-10
PAIN_FUNCTIONAL_ASSESSMENT: ACTIVITIES ARE NOT PREVENTED
PAIN_FUNCTIONAL_ASSESSMENT: PREVENTS OR INTERFERES SOME ACTIVE ACTIVITIES AND ADLS

## 2019-09-18 ASSESSMENT — PAIN DESCRIPTION - ONSET
ONSET: ON-GOING
ONSET: ON-GOING

## 2019-09-18 ASSESSMENT — PAIN DESCRIPTION - LOCATION
LOCATION: ANKLE
LOCATION: ANKLE

## 2019-09-18 ASSESSMENT — PAIN SCALES - GENERAL
PAINLEVEL_OUTOF10: 5
PAINLEVEL_OUTOF10: 5

## 2019-09-18 ASSESSMENT — ENCOUNTER SYMPTOMS
NAUSEA: 0
BACK PAIN: 0

## 2019-09-18 ASSESSMENT — PAIN DESCRIPTION - PAIN TYPE
TYPE: ACUTE PAIN
TYPE: ACUTE PAIN

## 2019-09-18 ASSESSMENT — PAIN DESCRIPTION - PROGRESSION
CLINICAL_PROGRESSION: NOT CHANGED
CLINICAL_PROGRESSION: NOT CHANGED

## 2019-09-18 ASSESSMENT — PAIN DESCRIPTION - FREQUENCY
FREQUENCY: CONTINUOUS
FREQUENCY: CONTINUOUS

## 2019-09-18 ASSESSMENT — PAIN DESCRIPTION - DESCRIPTORS: DESCRIPTORS: THROBBING;SORE

## 2019-09-18 ASSESSMENT — PAIN DESCRIPTION - ORIENTATION
ORIENTATION: LEFT
ORIENTATION: LEFT

## 2019-12-15 ENCOUNTER — HOSPITAL ENCOUNTER (EMERGENCY)
Age: 24
Discharge: HOME | End: 2019-12-15
Payer: COMMERCIAL

## 2019-12-15 VITALS
SYSTOLIC BLOOD PRESSURE: 148 MMHG | TEMPERATURE: 99.1 F | RESPIRATION RATE: 16 BRPM | OXYGEN SATURATION: 99 % | BODY MASS INDEX: 36.1 KG/M2 | HEART RATE: 95 BPM | HEIGHT: 67 IN | DIASTOLIC BLOOD PRESSURE: 84 MMHG | WEIGHT: 230 LBS

## 2019-12-15 DIAGNOSIS — Z20.828 EXPOSURE TO INFLUENZA: ICD-10-CM

## 2019-12-15 DIAGNOSIS — B34.9 VIRAL ILLNESS: Primary | ICD-10-CM

## 2019-12-15 LAB
RAPID INFLUENZA  B AGN: NEGATIVE
RAPID INFLUENZA A AGN: NEGATIVE

## 2019-12-15 PROCEDURE — 6370000000 HC RX 637 (ALT 250 FOR IP): Performed by: PHYSICIAN ASSISTANT

## 2019-12-15 PROCEDURE — 99284 EMERGENCY DEPT VISIT MOD MDM: CPT

## 2019-12-15 PROCEDURE — 87804 INFLUENZA ASSAY W/OPTIC: CPT

## 2019-12-15 RX ORDER — OSELTAMIVIR PHOSPHATE 75 MG/1
75 CAPSULE ORAL 2 TIMES DAILY
Qty: 10 CAPSULE | Refills: 0 | Status: SHIPPED | OUTPATIENT
Start: 2019-12-15 | End: 2019-12-20

## 2019-12-15 RX ORDER — IBUPROFEN 600 MG/1
600 TABLET ORAL ONCE
Status: COMPLETED | OUTPATIENT
Start: 2019-12-15 | End: 2019-12-15

## 2019-12-15 RX ORDER — ACETAMINOPHEN 500 MG
1000 TABLET ORAL ONCE
Status: COMPLETED | OUTPATIENT
Start: 2019-12-15 | End: 2019-12-15

## 2019-12-15 RX ADMIN — ACETAMINOPHEN 1000 MG: 500 TABLET, FILM COATED ORAL at 20:09

## 2019-12-15 RX ADMIN — IBUPROFEN 600 MG: 600 TABLET, FILM COATED ORAL at 20:09

## 2019-12-15 ASSESSMENT — PAIN DESCRIPTION - PAIN TYPE: TYPE: ACUTE PAIN

## 2019-12-15 ASSESSMENT — PAIN SCALES - GENERAL
PAINLEVEL_OUTOF10: 8
PAINLEVEL_OUTOF10: 8

## 2019-12-15 ASSESSMENT — PAIN DESCRIPTION - LOCATION: LOCATION: GENERALIZED

## 2020-04-01 ENCOUNTER — HOSPITAL ENCOUNTER (EMERGENCY)
Age: 25
Discharge: HOME | End: 2020-04-01
Attending: EMERGENCY MEDICINE
Payer: COMMERCIAL

## 2020-04-01 VITALS
DIASTOLIC BLOOD PRESSURE: 73 MMHG | BODY MASS INDEX: 35.73 KG/M2 | WEIGHT: 228.1 LBS | HEART RATE: 84 BPM | OXYGEN SATURATION: 100 % | SYSTOLIC BLOOD PRESSURE: 129 MMHG | RESPIRATION RATE: 16 BRPM

## 2020-04-01 LAB
BACTERIA WET PREP: NORMAL
BILIRUBIN URINE: NEGATIVE
BLOOD, URINE: NEGATIVE
CLARITY: ABNORMAL
CLUE CELLS: NORMAL
COLOR: YELLOW
EPITHELIAL CELLS WET PREP: NORMAL
GLUCOSE URINE: NEGATIVE MG/DL
HCG(URINE) PREGNANCY TEST: NEGATIVE
KETONES, URINE: NEGATIVE MG/DL
LEUKOCYTE ESTERASE, URINE: NEGATIVE
MICROSCOPIC EXAMINATION: ABNORMAL
NITRITE, URINE: NEGATIVE
PH UA: 5.5 (ref 5–8)
PROTEIN UA: NEGATIVE MG/DL
RBC WET PREP: NORMAL
SOURCE WET PREP: NORMAL
SPECIFIC GRAVITY UA: >=1.03 (ref 1–1.03)
TRICHOMONAS PREP: NORMAL
URINE REFLEX TO CULTURE: ABNORMAL
URINE TYPE: ABNORMAL
UROBILINOGEN, URINE: 0.2 E.U./DL
WBC WET PREP: NORMAL
YEAST WET PREP: NORMAL

## 2020-04-01 PROCEDURE — 87491 CHLMYD TRACH DNA AMP PROBE: CPT

## 2020-04-01 PROCEDURE — 6360000002 HC RX W HCPCS: Performed by: EMERGENCY MEDICINE

## 2020-04-01 PROCEDURE — 96372 THER/PROPH/DIAG INJ SC/IM: CPT

## 2020-04-01 PROCEDURE — 87210 SMEAR WET MOUNT SALINE/INK: CPT

## 2020-04-01 PROCEDURE — 81003 URINALYSIS AUTO W/O SCOPE: CPT

## 2020-04-01 PROCEDURE — 6370000000 HC RX 637 (ALT 250 FOR IP): Performed by: EMERGENCY MEDICINE

## 2020-04-01 PROCEDURE — 84703 CHORIONIC GONADOTROPIN ASSAY: CPT

## 2020-04-01 PROCEDURE — 87591 N.GONORRHOEAE DNA AMP PROB: CPT

## 2020-04-01 PROCEDURE — 99283 EMERGENCY DEPT VISIT LOW MDM: CPT

## 2020-04-01 RX ORDER — CEFTRIAXONE SODIUM 250 MG/1
250 INJECTION, POWDER, FOR SOLUTION INTRAMUSCULAR; INTRAVENOUS ONCE
Status: COMPLETED | OUTPATIENT
Start: 2020-04-01 | End: 2020-04-01

## 2020-04-01 RX ORDER — AZITHROMYCIN 250 MG/1
1000 TABLET, FILM COATED ORAL ONCE
Status: COMPLETED | OUTPATIENT
Start: 2020-04-01 | End: 2020-04-01

## 2020-04-01 RX ADMIN — CEFTRIAXONE SODIUM 250 MG: 250 INJECTION, POWDER, FOR SOLUTION INTRAMUSCULAR; INTRAVENOUS at 11:25

## 2020-04-01 RX ADMIN — AZITHROMYCIN MONOHYDRATE 1000 MG: 250 TABLET ORAL at 11:25

## 2020-04-01 NOTE — ED NOTES
Pt states understanding of d/c instructions. Pt alert and oriented with steady gait upon discharge.       Sudheer Atkinson RN  04/01/20 6338

## 2020-04-01 NOTE — ED PROVIDER NOTES
ENT:  Mucous membranes are moist.  Pharynx without erythema or exudates. NECK: Nontender and supple. CHEST: Clear to auscultation bilaterally. No rales, rhonchi, or wheezing. HEART:  Regular rate and rhythm. No murmurs. Strong and equal pulses in the upper and lower extremities. ABDOMEN: Soft,  nondistended, positive bowel sounds. abdomen is nontender. MUSCULOSKELETAL:  Active range of motion of the upper and lower extremities. No edema. NEUROLOGICAL: Awake, alert and oriented x 3. Power intact in the upper and lower extremities. DERMATOLOGIC: No petechiae, rashes, or ecchymoses.       ED COURSE AND MEDICAL DECISION MAKING:    Results for orders placed or performed during the hospital encounter of 04/01/20   Wet Prep, Genital   Result Value Ref Range    Trichomonas Prep None Seen     Yeast, Wet Prep None Seen     Clue Cells, Wet Prep None Seen     WBC, Wet Prep 1+     RBC, Wet Prep <1+     Epi Cells 2+     Bacteria <1+     Source Wet Prep Vaginal    Urinalysis Reflex to Culture   Result Value Ref Range    Color, UA Yellow Straw/Yellow    Clarity, UA SL CLOUDY (A) Clear    Glucose, Ur Negative Negative mg/dL    Bilirubin Urine Negative Negative    Ketones, Urine Negative Negative mg/dL    Specific Gravity, UA >=1.030 1.005 - 1.030    Blood, Urine Negative Negative    pH, UA 5.5 5.0 - 8.0    Protein, UA Negative Negative mg/dL    Urobilinogen, Urine 0.2 <2.0 E.U./dL    Nitrite, Urine Negative Negative    Leukocyte Esterase, Urine Negative Negative    Microscopic Examination Not Indicated     Urine Type NotGiven     Urine Reflex to Culture Not Indicated    Pregnancy, Urine   Result Value Ref Range    HCG(Urine) Pregnancy Test Negative Detects HCG level >20 MIU/mL       Treatment in the department:  Patient received   Medications   cefTRIAXone (ROCEPHIN) injection 250 mg (has no administration in time range)   azithromycin (ZITHROMAX) tablet 1,000 mg (has no administration in time range)      while in the

## 2020-04-02 LAB
C TRACH DNA GENITAL QL NAA+PROBE: NEGATIVE
N. GONORRHOEAE DNA: NEGATIVE

## 2020-08-05 ENCOUNTER — HOSPITAL ENCOUNTER (EMERGENCY)
Age: 25
Discharge: HOME | End: 2020-08-06
Attending: EMERGENCY MEDICINE
Payer: COMMERCIAL

## 2020-08-05 VITALS
HEART RATE: 87 BPM | TEMPERATURE: 98 F | DIASTOLIC BLOOD PRESSURE: 84 MMHG | RESPIRATION RATE: 18 BRPM | HEIGHT: 68 IN | WEIGHT: 225 LBS | BODY MASS INDEX: 34.1 KG/M2 | SYSTOLIC BLOOD PRESSURE: 145 MMHG | OXYGEN SATURATION: 99 %

## 2020-08-05 LAB
BILIRUBIN URINE: ABNORMAL
BLOOD, URINE: ABNORMAL
CLARITY: ABNORMAL
COLOR: ABNORMAL
EPITHELIAL CELLS, UA: ABNORMAL /HPF (ref 0–5)
GLUCOSE URINE: ABNORMAL MG/DL
HCG(URINE) PREGNANCY TEST: NEGATIVE
KETONES, URINE: ABNORMAL MG/DL
LEUKOCYTE ESTERASE, URINE: ABNORMAL
MICROSCOPIC EXAMINATION: YES
NITRITE, URINE: ABNORMAL
PH UA: ABNORMAL (ref 5–8)
PROTEIN UA: ABNORMAL MG/DL
RBC UA: >100 /HPF (ref 0–4)
SPECIFIC GRAVITY UA: ABNORMAL (ref 1–1.03)
URINE REFLEX TO CULTURE: YES
URINE TYPE: ABNORMAL
UROBILINOGEN, URINE: ABNORMAL E.U./DL
WBC UA: ABNORMAL /HPF (ref 0–5)

## 2020-08-05 PROCEDURE — 81001 URINALYSIS AUTO W/SCOPE: CPT

## 2020-08-05 PROCEDURE — 87591 N.GONORRHOEAE DNA AMP PROB: CPT

## 2020-08-05 PROCEDURE — 87491 CHLMYD TRACH DNA AMP PROBE: CPT

## 2020-08-05 PROCEDURE — 99283 EMERGENCY DEPT VISIT LOW MDM: CPT

## 2020-08-05 PROCEDURE — 84703 CHORIONIC GONADOTROPIN ASSAY: CPT

## 2020-08-05 ASSESSMENT — ENCOUNTER SYMPTOMS
ABDOMINAL PAIN: 0
TROUBLE SWALLOWING: 0
EYE REDNESS: 0
SHORTNESS OF BREATH: 0
CHEST TIGHTNESS: 0
CHOKING: 0
VOMITING: 0
PHOTOPHOBIA: 0
BACK PAIN: 0
NAUSEA: 0

## 2020-08-06 LAB
C TRACH DNA GENITAL QL NAA+PROBE: NEGATIVE
N. GONORRHOEAE DNA: NEGATIVE

## 2020-08-06 NOTE — ED PROVIDER NOTES
[id number] [facility] RESIDENT NOTE       Date of evaluation: 8/5/2020    Chief Complaint     Foreign Body in Vagina      History of Present Illness     Mevelyn Cranker is a 26 y.o. female who presents with concerns that she has a foreign body in her vagina, possibly a tampon. Patient does not recall specifically leaving a tampon in place but noted today that she started to have some bleeding and can remember if she placed one. There was no pain, discharge or any other reason that prompted her to check her vagina. However, after she checked she noticed some fatty tissue that seemed unusual.  She is never noticed this in the past.  Her mother recommended she come in for evaluation. Again, she denies pain. She denies fevers, chills, nausea, vomiting, dysuria. She is currently having some spotting and thinks that she is currently on her period. She is sexually active and uses condoms. Her last intercourse was 2 weeks ago. She has had a prior gonorrhea infection several years ago that was treated. She denies any concerns for STI at this time. Review of Systems     Review of Systems   Constitutional: Negative for chills and fever. HENT: Negative for congestion and trouble swallowing. Eyes: Negative for photophobia and redness. Respiratory: Negative for choking, chest tightness and shortness of breath. Cardiovascular: Negative for chest pain and leg swelling. Gastrointestinal: Negative for abdominal pain, nausea and vomiting. Genitourinary: Negative for difficulty urinating, dyspareunia, flank pain, frequency, hematuria, menstrual problem, pelvic pain, vaginal discharge and vaginal pain. Musculoskeletal: Negative for back pain and neck pain. Neurological: Negative for dizziness and headaches. All other systems reviewed and are negative. Past Medical, Surgical, Family, and Social History     She has no past medical history on file.   She has a past surgical history that includes  section. Her family history is not on file. She reports that she has never smoked. She has never used smokeless tobacco. She reports current alcohol use. She reports that she does not use drugs. Medications     Previous Medications    CRANBERRY-VITAMIN C (AZO CRANBERRY URINARY TRACT PO)    Take by mouth    DICLOFENAC (VOLTAREN) 75 MG EC TABLET    Take 1 tablet by mouth 2 times daily as needed for Pain    IBUPROFEN (IBU) 600 MG TABLET    Take 1 tablet by mouth every 6 hours as needed for Pain    ONDANSETRON (ZOFRAN ODT) 4 MG DISINTEGRATING TABLET    Take 1 tablet by mouth every 8 hours as needed for Nausea or Vomiting May Sub regular tablet (non-ODT) if insurance does not cover ODT. Allergies     She has No Known Allergies. Physical Exam     INITIAL VITALS: BP: (!) 145/84, Temp: 98 °F (36.7 °C), Pulse: 87, Resp: 18, SpO2: 99 %   Physical Exam  Constitutional:       Appearance: Normal appearance. HENT:      Head: Normocephalic and atraumatic. Mouth/Throat:      Mouth: Mucous membranes are moist.      Pharynx: Oropharynx is clear. Eyes:      Extraocular Movements: Extraocular movements intact. Pupils: Pupils are equal, round, and reactive to light. Neck:      Musculoskeletal: Normal range of motion and neck supple. Cardiovascular:      Rate and Rhythm: Normal rate and regular rhythm. Pulses: Normal pulses. Heart sounds: Normal heart sounds. Pulmonary:      Effort: Pulmonary effort is normal. No respiratory distress. Breath sounds: Normal breath sounds. Abdominal:      General: Abdomen is flat. Palpations: Abdomen is soft. Genitourinary:     Comments: Normal-appearing external genitalia without lesions or sores. Labia majora and minora unremarkable. Vaginal vault with mild amount of blood. Cervix visualized without erythema or abnormalities. No significant cervical motion tenderness.   No identified foreign body.  Skin:     General: Skin is warm and dry. Capillary Refill: Capillary refill takes less than 2 seconds. Neurological:      General: No focal deficit present. Mental Status: She is alert and oriented to person, place, and time. DiagnosticResults         RADIOLOGY:  No orders to display       LABS:   Results for orders placed or performed during the hospital encounter of 08/05/20   Urinalysis Reflex to Culture    Specimen: Urine, clean catch   Result Value Ref Range    Color, UA RED (A) Straw/Yellow    Clarity, UA CLOUDY (A) Clear    Glucose, Ur Color Interfer (A) Negative mg/dL    Bilirubin Urine Color Interfer (A) Negative    Ketones, Urine Color Interfer (A) Negative mg/dL    Specific Gravity, UA Color Interfer 1.005 - 1.030    Blood, Urine Color Interfer (A) Negative    pH, UA Color Interfer (A) 5.0 - 8.0    Protein, UA Color Interfer (A) Negative mg/dL    Urobilinogen, Urine Color Interfer (A) <2.0 E.U./dL    Nitrite, Urine Color Interfer (A) Negative    Leukocyte Esterase, Urine Color Interfer (A) Negative    Microscopic Examination YES     Urine Type Voided     Urine Reflex to Culture Yes    hCG, qualitative, pregnancy (Lab)   Result Value Ref Range    HCG(Urine) Pregnancy Test Negative Detects HCG level >20 MIU/mL   Microscopic Urinalysis   Result Value Ref Range    WBC, UA see below (A) 0 - 5 /HPF    RBC, UA >100 (A) 0 - 4 /HPF    Epithelial Cells, UA 21-50 (A) 0 - 5 /HPF       ED BEDSIDE ULTRASOUND:      RECENT VITALS:  BP: (!) 145/84, Temp: 98 °F (36.7 °C), Pulse: 87,Resp: 18, SpO2: 99 %     Procedures         ED Course     Nursing Notes, Past Medical Hx, Past Surgical Hx, Social Hx, Allergies, and Family Hx were reviewed. The patient was given the followingmedications:  No orders of the defined types were placed in this encounter.       CONSULTS:  None    MEDICAL DECISION MAKING / ASSESSMENT / PLAN     Kim Aden is a 26 y.o. female who presents with concerns for a foreign body in her vagina, possibly a tampon. She does not recall specifically if she placed a tampon in her vagina but is concerned that she did it and forgot. She denies any significant pain, discharge or other abnormalities. She is currently having some spotting and think she is on her period. A pelvic exam was performed and identified no foreign body. She has some bleeding, consistent with her menstrual cycle but the cervix was visualized and unremarkable. No trauma to the vaginal walls. No significant pain on exam.  Urinalysis and urine pregnancy sent and unremarkable. Gonorrhea and Chlamydia testing also sent given she is sexually active. Patient is okay for discharge at this time. If she develops significant vaginal bleeding, vaginal pain or purulent discharge, she should return to the ER. She can otherwise follow-up with her primary gynecologist as needed. This patient was also evaluated by the attending physician. All care plans werediscussed and agreed upon. Clinical Impression     1.  Vaginal foreign body, initial encounter        Disposition     PATIENT REFERRED TO:  Wilbert Morales MD  [address] [address]  [phone]    Schedule an appointment as soon as possible for a visit         DISCHARGE MEDICATIONS:  New Prescriptions    No medications on file       DISPOSITION  : discharge home        Lynne Jade MD  Resident  08/05/20 [id number]

## 2020-08-06 NOTE — ED TRIAGE NOTES
Pt to ED for possible tampon lodged in vagina. Pt states that she is unsure if she put a tampon in so she is unsure if there is one in her vagina. Pt states that her vagina looks different. Pt states that she has some cramping. Pt denies vagina discharge.

## 2020-08-06 NOTE — ED PROVIDER NOTES
ED Attending Attestation Note     Date of evaluation: 8/5/2020    This patient was seen by the resident. I have seen and examined the patient, agree with the workup, evaluation, management and diagnosis. The care plan has been discussed. My assessment reveals complaints of possible retained tampon. Patient is unsure if she placed a tampon earlier today to came to the emergency department for evaluation. Patient has a soft abdomen on exam with no rashes or fevers noted.   Will perform gynecologic exam.     Morena Bueno MD  08/05/20 2591

## 2020-08-06 NOTE — ED NOTES
Dc inst given to pt and verb understanding.   Dc'd ambulatory to private auto by self     Manual JOSE GUADALUPE Renae  08/06/20 0010

## 2021-01-08 ENCOUNTER — APPOINTMENT (OUTPATIENT)
Dept: CT IMAGING | Age: 26
End: 2021-01-08
Payer: COMMERCIAL

## 2021-01-08 ENCOUNTER — HOSPITAL ENCOUNTER (EMERGENCY)
Age: 26
Discharge: HOME | End: 2021-01-08
Attending: EMERGENCY MEDICINE
Payer: COMMERCIAL

## 2021-01-08 VITALS
BODY MASS INDEX: 37.25 KG/M2 | RESPIRATION RATE: 16 BRPM | DIASTOLIC BLOOD PRESSURE: 81 MMHG | OXYGEN SATURATION: 98 % | SYSTOLIC BLOOD PRESSURE: 137 MMHG | HEART RATE: 100 BPM | TEMPERATURE: 98.8 F | WEIGHT: 245 LBS

## 2021-01-08 DIAGNOSIS — R51.9 ACUTE NONINTRACTABLE HEADACHE, UNSPECIFIED HEADACHE TYPE: Primary | ICD-10-CM

## 2021-01-08 DIAGNOSIS — I10 HYPERTENSION, UNSPECIFIED TYPE: ICD-10-CM

## 2021-01-08 LAB — HCG(URINE) PREGNANCY TEST: NEGATIVE

## 2021-01-08 PROCEDURE — 84703 CHORIONIC GONADOTROPIN ASSAY: CPT

## 2021-01-08 PROCEDURE — 6360000002 HC RX W HCPCS: Performed by: EMERGENCY MEDICINE

## 2021-01-08 PROCEDURE — 99283 EMERGENCY DEPT VISIT LOW MDM: CPT

## 2021-01-08 PROCEDURE — 96372 THER/PROPH/DIAG INJ SC/IM: CPT

## 2021-01-08 PROCEDURE — 70450 CT HEAD/BRAIN W/O DYE: CPT

## 2021-01-08 RX ORDER — BUTALBITAL, ACETAMINOPHEN AND CAFFEINE 50; 325; 40 MG/1; MG/1; MG/1
1 TABLET ORAL EVERY 6 HOURS PRN
Qty: 20 TABLET | Refills: 0 | Status: SHIPPED | OUTPATIENT
Start: 2021-01-08 | End: 2021-08-06

## 2021-01-08 RX ORDER — KETOROLAC TROMETHAMINE 30 MG/ML
30 INJECTION, SOLUTION INTRAMUSCULAR; INTRAVENOUS ONCE
Status: COMPLETED | OUTPATIENT
Start: 2021-01-08 | End: 2021-01-08

## 2021-01-08 RX ADMIN — KETOROLAC TROMETHAMINE 30 MG: 30 INJECTION, SOLUTION INTRAMUSCULAR at 05:45

## 2021-01-08 ASSESSMENT — PAIN SCALES - GENERAL
PAINLEVEL_OUTOF10: 4
PAINLEVEL_OUTOF10: 4

## 2021-01-08 ASSESSMENT — ENCOUNTER SYMPTOMS
CHEST TIGHTNESS: 0
STRIDOR: 0
WHEEZING: 0
COUGH: 0
NAUSEA: 0
SHORTNESS OF BREATH: 0
DIARRHEA: 0
VOMITING: 0
ABDOMINAL PAIN: 0

## 2021-01-08 ASSESSMENT — PAIN DESCRIPTION - FREQUENCY: FREQUENCY: CONTINUOUS

## 2021-01-08 ASSESSMENT — PAIN DESCRIPTION - ORIENTATION: ORIENTATION: RIGHT;LEFT

## 2021-01-08 ASSESSMENT — PAIN DESCRIPTION - PAIN TYPE: TYPE: ACUTE PAIN

## 2021-01-08 ASSESSMENT — PAIN DESCRIPTION - DESCRIPTORS: DESCRIPTORS: THROBBING

## 2021-01-08 NOTE — ED PROVIDER NOTES
[address]      Pt Name: Sourav Colin  MRN: [id number]  [place] 1995  Date of evaluation: 1/8/2021  Luis Badillo MD    CHIEF COMPLAINT       Chief Complaint   Patient presents with    Headache     janice temple area x weeks, denies N/V          HISTORY OF PRESENT ILLNESS   (Location/Symptom, Timing/Onset,Context/Setting, Quality, Duration, Modifying Factors, Severity)  Note limiting factors. Sourav Colin is a 26 y.o. female with no significant medical history who presents to the emergency department for headache. Patient reports she's been having headaches daily for 2-3 weeks, that will stay on for several hours at a time. Has taken multiple over the counter medications without much improvement. Reports bilateral temple pain described as 'aching.' Patient reports she's had previous headaches that feel similar, however they were never this frequent or long lasting. Denies blurry vision, tingling, weakness or numbness, cp, sob, nausea, vomiting, fever, photophobia, phonophobia. -States tonight headache woke her up from sleep so decided to come in. States as she was getting ready headache had resolved but now starting to come back. HPI    Nursing Notes were reviewed. REVIEW OF SYSTEMS    (2-9 systems for level 4, 10 or more for level 5)     Review of Systems   Constitutional: Negative for activity change, appetite change, diaphoresis and fever. Respiratory: Negative for cough, chest tightness, shortness of breath, wheezing and stridor. Cardiovascular: Negative for chest pain and palpitations. Gastrointestinal: Negative for abdominal pain, diarrhea, nausea and vomiting. Skin: Negative for rash and wound. Neurological: Positive for headaches. Negative for dizziness, weakness, light-headedness and numbness. Except as noted above the remainder of the review of systems was reviewedand negative.        PAST MEDICAL by the emergency physician with the below findings:      Interpretation per the Radiologist below, if available at the time of this note:    CT HEAD WO CONTRAST   Final Result     No acute hemorrhage, hydrocephalus, or mass effect. ED BEDSIDE ULTRASOUND:   Performed by ED Physician - none    LABS:  Labs Reviewed   PREGNANCY, URINE    Narrative:     Performed at:  [facility]) [facility]  Judie Johnson,  [place]   Phone [phone]       All other labs were within normal range ornot returned as of this dictation. EMERGENCY DEPARTMENT COURSE and DIFFERENTIAL DIAGNOSIS/MDM:   Vitals:    Vitals:    01/08/21 0424 01/08/21 0437   BP: (!) 159/103 (!) 150/89   Pulse: 100    Resp: 16    Temp: 98.8 °F (37.1 °C)    TempSrc: Oral    SpO2: 98%    Weight: 245 lb (111.1 kg)          MDM      ED COURSE/MDM    -Kesley Morrow is a 26 y.o. female with no significant medical history who presents to ED for headache x 2-3 weeks, daily, unrelieved with over the counter medications. Physical exam is unremarkable, no focal deficits, no truncal or gait ataxia. Patient is well-appearing not in acute distress.  -On arrival slightly elevated blood pressure 159/103, repeat shows mild improvement to 150/89.  -Negative urine pregnancy  -Patient was given Toradol IM. I was unable to administer other medications including Fioricet, Compazine/Benadryl as patient has her young daughter with her and drove here to the ED.  -CT head shows no acute hemorrhage, hydrocephalus or mass-effect  -Toradol 30 mg IM x1  Reassessment patient reports significant provement of headache with the Toradol improvement from 7-8/10 to 4/10.  -imaging reviewed and results discussed with patient. Noacute pathology was noted and plan for discharge home with rx fioricet. PCP referral will also be given as patient states she has been looking for one.  Patient with no known history of HTN ( history of gestational HTN) though patient's blood pressure was elevated here in ED unclear if chronic or transiently high reading. (Per chart review blood pressure readings range from systolic of [id number]). Therefore will defer starting it at this time, however encouraged patient to keep a log of blood pressure readings to take to PCP appointment. I estimate there is LOW risk for BACTERIAL MENINGITIS, ISCHEMIC OR HEMORRHAGE CVA (Ex. SAH), VASCULAR DISSECTION, TEMPORAL ARTERITIS, or ACUTE CORONARY SYNDROME thus I consider the discharge disposition reasonable. Patient and I have discussed the diagnosis and risks, and we agree with discharging home with close follow-up. We also discussed returning to the Emergency Department immediately if new or worsening symptoms occur. We have discussed the symptoms which are most concerning that necessitate immediate return. Patient in agreement withplan, verbally confirm understanding and have no further questions/concerns. REASSESSMENT      Well appearing, non toxic, alert, oriented speaking in full sentences and hemodynamically stable upon discharge      CRITICAL CARE TIME   Total Critical Care time was 0 minutes, excluding separately reportableprocedures. There was a high probability of clinicallysignificant/life threatening deterioration in the patient's condition which required my urgent intervention. CONSULTS:  None    PROCEDURES:  Unless otherwise noted below, none     Procedures    FINAL IMPRESSION      1. Acute nonintractable headache, unspecified headache type    2.  Hypertension, unspecified type          DISPOSITION/PLAN   DISPOSITION        PATIENT REFERREDTO:  [facility]) Pre-Services  [phone]          DISCHARGE MEDICATIONS:  New Prescriptions    BUTALBITAL-ACETAMINOPHEN-CAFFEINE (FIORICET, ESGIC) -40 MG PER TABLET    Take 1 tablet by mouth every 6 hours as needed for Headaches          (Please note that portions of this note were completed with a voice recognition program.  Efforts were made to edit the dictations but occasionally wordsare mis-transcribed.)    Gerri Perry MD (electronically signed)  Attending Emergency Physician            Gerri Perry MD  01/08/21 6070

## 2021-01-08 NOTE — ED NOTES
Pt to ed with c/o janice temple area head ache pain states for weeks, denies N/V, just a throbbing pain.  Exam per MD.      Jeanette Zepeda RN  01/08/21 5968

## 2021-01-08 NOTE — ED NOTES
Pt dc/d with instructions and rx in stable condition, ambulatory to lobby. Home per ride.      Rhonda Benoit RN  01/08/21 4600

## 2021-01-14 ENCOUNTER — HOSPITAL ENCOUNTER (EMERGENCY)
Age: 26
Discharge: HOME | End: 2021-01-15
Attending: EMERGENCY MEDICINE
Payer: COMMERCIAL

## 2021-01-14 DIAGNOSIS — G25.71 AKATHISIA: Primary | ICD-10-CM

## 2021-01-14 PROCEDURE — 99283 EMERGENCY DEPT VISIT LOW MDM: CPT

## 2021-01-14 RX ORDER — TOPIRAMATE 100 MG/1
100 TABLET, FILM COATED ORAL DAILY
COMMUNITY
End: 2021-01-16

## 2021-01-15 ENCOUNTER — APPOINTMENT (OUTPATIENT)
Dept: GENERAL RADIOLOGY | Age: 26
End: 2021-01-15
Payer: COMMERCIAL

## 2021-01-15 VITALS
WEIGHT: 240 LBS | DIASTOLIC BLOOD PRESSURE: 112 MMHG | HEIGHT: 66 IN | TEMPERATURE: 98.5 F | RESPIRATION RATE: 16 BRPM | OXYGEN SATURATION: 93 % | SYSTOLIC BLOOD PRESSURE: 149 MMHG | BODY MASS INDEX: 38.57 KG/M2 | HEART RATE: 111 BPM

## 2021-01-15 LAB
BILIRUBIN URINE: NEGATIVE
BLOOD, URINE: NEGATIVE
CLARITY: CLEAR
COLOR: YELLOW
GLUCOSE URINE: NEGATIVE MG/DL
HCG(URINE) PREGNANCY TEST: NEGATIVE
KETONES, URINE: NEGATIVE MG/DL
LEUKOCYTE ESTERASE, URINE: NEGATIVE
MICROSCOPIC EXAMINATION: NORMAL
NITRITE, URINE: NEGATIVE
PH UA: 7.5 (ref 5–8)
PROTEIN UA: NEGATIVE MG/DL
RAPID INFLUENZA  B AGN: NEGATIVE
RAPID INFLUENZA A AGN: NEGATIVE
SARS-COV-2, PCR: NOT DETECTED
SPECIFIC GRAVITY UA: 1.02 (ref 1–1.03)
URINE TYPE: NORMAL
UROBILINOGEN, URINE: 0.2 E.U./DL

## 2021-01-15 PROCEDURE — 84703 CHORIONIC GONADOTROPIN ASSAY: CPT

## 2021-01-15 PROCEDURE — 81003 URINALYSIS AUTO W/O SCOPE: CPT

## 2021-01-15 PROCEDURE — 71045 X-RAY EXAM CHEST 1 VIEW: CPT

## 2021-01-15 PROCEDURE — 87804 INFLUENZA ASSAY W/OPTIC: CPT

## 2021-01-15 PROCEDURE — U0003 INFECTIOUS AGENT DETECTION BY NUCLEIC ACID (DNA OR RNA); SEVERE ACUTE RESPIRATORY SYNDROME CORONAVIRUS 2 (SARS-COV-2) (CORONAVIRUS DISEASE [COVID-19]), AMPLIFIED PROBE TECHNIQUE, MAKING USE OF HIGH THROUGHPUT TECHNOLOGIES AS DESCRIBED BY CMS-2020-01-R: HCPCS

## 2021-01-15 PROCEDURE — 6370000000 HC RX 637 (ALT 250 FOR IP): Performed by: EMERGENCY MEDICINE

## 2021-01-15 RX ORDER — DIPHENHYDRAMINE HCL 25 MG
50 TABLET ORAL ONCE
Status: COMPLETED | OUTPATIENT
Start: 2021-01-15 | End: 2021-01-15

## 2021-01-15 RX ORDER — ACETAMINOPHEN 500 MG
1000 TABLET ORAL ONCE
Status: COMPLETED | OUTPATIENT
Start: 2021-01-15 | End: 2021-01-15

## 2021-01-15 RX ADMIN — DIPHENHYDRAMINE HYDROCHLORIDE 50 MG: 25 TABLET, FILM COATED ORAL at 02:01

## 2021-01-15 RX ADMIN — ACETAMINOPHEN 1000 MG: 500 TABLET, COATED ORAL at 01:06

## 2021-01-15 ASSESSMENT — ENCOUNTER SYMPTOMS
RESPIRATORY NEGATIVE: 1
GASTROINTESTINAL NEGATIVE: 1
EYES NEGATIVE: 1
BACK PAIN: 1

## 2021-01-15 ASSESSMENT — PAIN SCALES - GENERAL: PAINLEVEL_OUTOF10: 5

## 2021-01-15 NOTE — ED PROVIDER NOTES
[address]          ATTENDING PHYSICIAN NOTE       Date of evaluation: 2021    Chief Complaint     Concern For COVID-19      History of Present Illness     Fely Ontiveros is a 26 y.o. female who presents to the emergency department complaining of body aches, chills, and low back pain. Patient states that earlier in the day she had a headache which is not a typical headache for her. She was seen in an outside emergency department and given IV medications for this with resolution of the headache. She states that approximate 2 hours prior to presentation as she was getting ready to go to sleep she started to feel very shaky and developed back pain so she was concerned and came to the emergency department here for evaluation. Patient denies fevers but has had chills. She denies any cough or shortness of breath. She denies any nausea, vomiting, or diarrhea. She denies any burning or pain with urination. 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    Review of Systems     Review of Systems   Constitutional: Positive for chills. Negative for fever. HENT: Negative. Eyes: Negative. Respiratory: Negative. Cardiovascular: Negative. Gastrointestinal: Negative. Genitourinary: Negative. Musculoskeletal: Positive for back pain. Neurological: Positive for headaches. All other systems reviewed and are negative. Past Medical, Surgical, Family, and Social History     She has a past medical history of Headache. She has a past surgical history that includes  section. Her family history is not on file. She reports that she has never smoked. She has never used smokeless tobacco. She reports current alcohol use. She reports that she does not use drugs.     Medications     Current Discharge Medication List      CONTINUE these medications which have NOT CHANGED    Details   topiramate (TOPAMAX) 100 MG tablet Take 100 mg by mouth daily      butalbital-acetaminophen-caffeine (FIORICET, ESGIC) -40 MG per tablet Take 1 tablet by mouth every 6 hours as needed for Headaches  Qty: 20 tablet, Refills: 0      ibuprofen (IBU) 600 MG tablet Take 1 tablet by mouth every 6 hours as needed for Pain  Qty: 30 tablet, Refills: 0      Cranberry-Vitamin C (AZO CRANBERRY URINARY TRACT PO) Take by mouth             Allergies     She has No Known Allergies. Physical Exam     INITIAL VITALS: BP: (!) 143/81, Temp: 98.5 °F (36.9 °C), Pulse: 111, Resp: 16, SpO2: 100 %   Physical Exam  Vitals signs and nursing note reviewed. Constitutional:       General: She is not in acute distress. Appearance: She is obese. HENT:      Head: Normocephalic and atraumatic. Mouth/Throat:      Mouth: Mucous membranes are moist.      Pharynx: No oropharyngeal exudate. Eyes:      General: No scleral icterus. Extraocular Movements: Extraocular movements intact. Conjunctiva/sclera: Conjunctivae normal.      Pupils: Pupils are equal, round, and reactive to light. Neck:      Musculoskeletal: Normal range of motion and neck supple. Cardiovascular:      Rate and Rhythm: Regular rhythm. Tachycardia present. Heart sounds: Normal heart sounds. Pulmonary:      Effort: Pulmonary effort is normal.      Breath sounds: Normal breath sounds. No wheezing, rhonchi or rales. Abdominal:      General: Bowel sounds are normal.      Palpations: Abdomen is soft. Tenderness: There is no abdominal tenderness. There is no guarding. Musculoskeletal: Normal range of motion. General: No swelling or tenderness. Skin:     General: Skin is warm and dry. Neurological:      General: No focal deficit present. Mental Status: She is alert and oriented to person, place, and time. Cranial Nerves: No cranial nerve deficit. Motor: No weakness.       Coordination: Coordination normal.         Diagnostic Results     RADIOLOGY:  XR CHEST PORTABLE Final Result     Normal chest x-ray. LABS:   Results for orders placed or performed during the hospital encounter of 01/14/21   Rapid Flu Swab    Specimen: Nasopharyngeal   Result Value Ref Range    Rapid Influenza A Ag Negative Negative    Rapid Influenza B Ag Negative Negative   Urinalysis, reflex to microscopic (Lab)   Result Value Ref Range    Color, UA Yellow Straw/Yellow    Clarity, UA Clear Clear    Glucose, Ur Negative Negative mg/dL    Bilirubin Urine Negative Negative    Ketones, Urine Negative Negative mg/dL    Specific Gravity, UA 1.020 1.005 - 1.030    Blood, Urine Negative Negative    pH, UA 7.5 5.0 - 8.0    Protein, UA Negative Negative mg/dL    Urobilinogen, Urine 0.2 <2.0 E.U./dL    Nitrite, Urine Negative Negative    Leukocyte Esterase, Urine Negative Negative    Microscopic Examination Not Indicated     Urine Type Voided    hCG, qualitative, pregnancy (Lab)   Result Value Ref Range    HCG(Urine) Pregnancy Test Negative Detects HCG level >20 MIU/mL     RECENT VITALS:  BP: (!) 149/112,Temp: 98.5 °F (36.9 °C), Pulse: 111, Resp: 16, SpO2: 93 %     Procedures     N/A    ED Course     Nursing Notes, Past Medical Hx, Past Surgical Hx, Social Hx,Allergies, and Family Hx were reviewed. patient was given the following medications:  Orders Placed This Encounter   Medications    acetaminophen (TYLENOL) tablet 1,000 mg    diphenhydrAMINE (BENADRYL) tablet 50 mg       CONSULTS:  None    MEDICAL DECISIONMAKING / ASSESSMENT / PLAN     Zeus Sim is a 26 y.o. female presents to the emergency department complaining of back pain and chills. Patient states that she had a headache earlier in the day and was seen at an outside hospital and treated for that but when she returned home she started helping sensation of chills and back pain. Patient denies any fevers. She denies any cough or shortness of breath. She denies any nausea, vomiting, or diarrhea.   On arrival, the patient is mildly tachycardic but otherwise stable vital signs. She has clear breath sounds and normal heart sounds. She has no focal neurologic deficits on exam.  Urinalysis shows no evidence of infection. hCG is negative. Influenza swab was negative. Chest x-ray shows no acute airspace disease. Coronavirus swab was sent and is pending. In review of the patient's records, she was given 2.5 mg of droperidol as part of her headache treatment so I feel that likely her symptoms are secondary to akathisias from this. Patient was given 50 mg of Benadryl p.o. in the emergency department. Patient will be discharged home with instructions to follow-up with her primary care provider. She will be instructed to self quarantine until her coronavirus swab results. Clinical Impression     1.  Akathisia        Disposition     PATIENT REFERRED TO:  Ana Tavares MD  [address] [place] [address]  [phone]            DISCHARGE MEDICATIONS:  Current Discharge Medication List          DISPOSITION Decision To Discharge 01/15/2021 01:36:52 AM        Nika Moscoso MD  01/15/21 9571

## 2021-01-15 NOTE — ED TRIAGE NOTES
Patient arrived to ED from home, due to symptoms of body aches, chills, and headache. Pt states that symptoms woke her up from her sleep. Patient states symptoms have gradually worsened all day. Pt is A&Ox4.

## 2021-01-16 ENCOUNTER — HOSPITAL ENCOUNTER (EMERGENCY)
Age: 26
Discharge: HOME | End: 2021-01-16
Attending: EMERGENCY MEDICINE
Payer: COMMERCIAL

## 2021-01-16 VITALS
HEART RATE: 90 BPM | DIASTOLIC BLOOD PRESSURE: 87 MMHG | TEMPERATURE: 98.5 F | OXYGEN SATURATION: 100 % | WEIGHT: 240.6 LBS | RESPIRATION RATE: 17 BRPM | SYSTOLIC BLOOD PRESSURE: 145 MMHG | BODY MASS INDEX: 38.83 KG/M2

## 2021-01-16 DIAGNOSIS — T50.905A MEDICATION ADVERSE EFFECT, INITIAL ENCOUNTER: Primary | ICD-10-CM

## 2021-01-16 PROCEDURE — 99282 EMERGENCY DEPT VISIT SF MDM: CPT

## 2021-01-16 NOTE — ED NOTES
Pt c/o anxiety and feeling funny, states called PCP and no answer so came to ER.      Grzegorz Salmon RN  01/16/21 2930

## 2021-01-16 NOTE — ED PROVIDER NOTES
Emergency Department provider note:    CHIEF COMPLAINT  Anxiety      HISTORY OF PRESENT ILLNESS  Vinnie Lopez is a 26 y.o. female who presents to the ED with a feeling that she is crawling out of her skin, a feeling of agitation and nervousness. The patient states that the symptoms started about 3 days ago, she had been dealing with headaches was prescribed Topamax, nurtec, and fiorcet. Shortly thereafter she was worked up in the emergency department for headache, was given a dose of droperidol, went to another emergency department with similar symptoms and was given a dose of Benadryl and was treated as a likely akathisia while being worked up for [place] and influenza. The Covid testing was negative, the patient continues to have a feeling of agitation and nervousness. She has not had a fever, head ache has resolved, no cough. No other complaints, modifying factors or associated symptoms. Nursing notes reviewed. Past Medical History:   Diagnosis Date    Headache      Past Surgical History:   Procedure Laterality Date     SECTION       History reviewed. No pertinent family history.   Social History     Socioeconomic History    Marital status: Single     Spouse name: Not on file    Number of children: Not on file    Years of education: Not on file    Highest education level: Not on file   Occupational History    Not on file   Social Needs    Financial resource strain: Not on file    Food insecurity     Worry: Not on file     Inability: Not on file    Transportation needs     Medical: Not on file     Non-medical: Not on file   Tobacco Use    Smoking status: Never Smoker    Smokeless tobacco: Never Used   Substance and Sexual Activity    Alcohol use: Yes     Comment: socially    Drug use: No    Sexual activity: Yes     Partners: Male   Lifestyle    Physical activity     Days per week: Not on file     Minutes per session: Not on file    Stress: Not on file   Relationships    Social connections     Talks on phone: Not on file     Gets together: Not on file     Attends [employer] service: Not on file     Active member of club or organization: Not on file     Attends meetings of clubs or organizations: Not on file     Relationship status: Not on file    Intimate partner violence     Fear of current or ex partner: Not on file     Emotionally abused: Not on file     Physically abused: Not on file     Forced sexual activity: Not on file   Other Topics Concern    Not on file   Social History Narrative    Not on file     No current facility-administered medications for this encounter. Current Outpatient Medications   Medication Sig Dispense Refill    butalbital-acetaminophen-caffeine (FIORICET, ESGIC) -40 MG per tablet Take 1 tablet by mouth every 6 hours as needed for Headaches 20 tablet 0    ibuprofen (IBU) 600 MG tablet Take 1 tablet by mouth every 6 hours as needed for Pain 30 tablet 0    Cranberry-Vitamin C (AZO CRANBERRY URINARY TRACT PO) Take by mouth       No Known Allergies    REVIEW OF SYSTEMS  10 systems reviewed, pertinent positives per HPI otherwise noted to be negative    PHYSICAL EXAM  BP (!) 145/87   Pulse 90   Temp 98.5 °F (36.9 °C) (Oral)   Resp 17   Wt 240 lb 9.6 oz (109.1 kg)   SpO2 100%   BMI 38.83 kg/m²   GENERAL APPEARANCE: Awake and alert. Cooperative. No acute distress. HEAD: Normocephalic. Atraumatic. EYES: PERRL. EOM's grossly intact. ENT: Mucous membranes are moist.   NECK: Supple. Normal ROM. CHEST: Equal symmetric chest rise. LUNGS: Breathing is unlabored. Speaking comfortably in full sentences. ABDOMEN: Nondistended  EXTREMITIES: MAEE. No acute deformities. SKIN: Warm and dry. NEUROLOGICAL: Alert and oriented. Strength is 5/5 in all extremities and sensation is intact. Gait is normal.  No tremor. LABS  No results found for this visit on 01/16/21.     RADIOLOGY  No orders to display         ED COURSE/MDM  Patient seen and evaluated. Based on patient's current symptoms, and recent work-up for the symptoms I reviewed this, and felt that the first diagnosis of akathisia secondary to droperidol was likely the right initial impression, however getting 2 days out still taking the other 3 new medications and continuing to have symptoms makes it more likely this is a medication adverse effect of one of the others. Reading the adverse effects of all 3 of those medications it is highly likely that the Topamax is the causative agent, and Nurtec and Fioricet do not have the symptoms as a side effect profile where is the Topamax has those symptoms within a 4 to 6% range. Advised the patient to stop the Topamax, take 25 mg of Benadryl up to 4 times a day and follow-up with her doctor in the next few days. Patient was given scripts for the following medications. I counseled patient how to take these medications. Current Discharge Medication List              CLINICAL IMPRESSION  1. Medication adverse effect, initial encounter        Blood pressure (!) 145/87, pulse 90, temperature 98.5 °F (36.9 °C), temperature source Oral, resp. rate 17, weight 240 lb 9.6 oz (109.1 kg), SpO2 100 %, not currently breastfeeding. DISPOSITION  Patient was discharged to home in good condition. Comment: Please note this report has been produced using speech recognition software and may contain errors related to that system including errors in grammar, punctuation, and spelling, as well as words and phrases that may be inappropriate. If there are any questions or concerns please feel free to contact the dictating provider for clarification.       Braden Harris MD  01/16/21 4460

## 2021-01-17 ENCOUNTER — CARE COORDINATION (OUTPATIENT)
Dept: CASE MANAGEMENT | Age: 26
End: 2021-01-17

## 2021-01-18 ENCOUNTER — CARE COORDINATION (OUTPATIENT)
Dept: CASE MANAGEMENT | Age: 26
End: 2021-01-18

## 2021-08-06 ENCOUNTER — HOSPITAL ENCOUNTER (EMERGENCY)
Age: 26
Discharge: HOME | End: 2021-08-06
Payer: COMMERCIAL

## 2021-08-06 VITALS
SYSTOLIC BLOOD PRESSURE: 117 MMHG | HEIGHT: 67 IN | HEART RATE: 89 BPM | TEMPERATURE: 98.5 F | WEIGHT: 215 LBS | DIASTOLIC BLOOD PRESSURE: 82 MMHG | RESPIRATION RATE: 16 BRPM | BODY MASS INDEX: 33.74 KG/M2 | OXYGEN SATURATION: 99 %

## 2021-08-06 DIAGNOSIS — T19.2XXA VAGINAL FOREIGN BODY, INITIAL ENCOUNTER: Primary | ICD-10-CM

## 2021-08-06 PROCEDURE — 99282 EMERGENCY DEPT VISIT SF MDM: CPT

## 2021-08-06 PROCEDURE — 46608 ANOSCOPY REMOVE FOR BODY: CPT

## 2021-08-06 ASSESSMENT — ENCOUNTER SYMPTOMS
NAUSEA: 0
VOMITING: 0
ABDOMINAL PAIN: 0
SHORTNESS OF BREATH: 0

## 2021-08-06 NOTE — ED PROVIDER NOTES
[facility]        Pt Name: Jene Simmonds  MRN: [id number]  [place] 1995  Date of evaluation: 8/6/2021  Provider: Otilio Ratliff PA-C  PCP: Britni Orta MD  Note Started: 3:39 AM EDT       ERIC. I have evaluated this patient. My supervising physician was available for consultation. CHIEF COMPLAINT       Chief Complaint   Patient presents with    Other     pt states she was having intercourse tonight and is now unable to locate the condomt that was being used. she is concerned it may still be inside her vagina. denies pain/bleeding. HISTORY OF PRESENT ILLNESS   (Location, Timing/Onset, Context/Setting, Quality, Duration, Modifying Factors, Severity, Associated Signs and Symptoms)  Note limiting factors. Chief Complaint: vaginal foreign body    Jene Simmonds is a 23 y.o. female who presents to the emergency department for evaluation of possible retained vaginal foreign body. Patient states she had intercourse with her boyfriend utilizing a condom. Patient states that she was unable to find the condom after intercourse. Patient states it smells as though it is retained in her vagina. She tried to remove in the shower but was unsuccessful. Patient is also on birth control for backup pregnancy prevention. Denies any dysuria or abdominal pain at this time. States this happened earlier this evening. Nursing Notes were all reviewed and agreed with or any disagreements were addressed in the HPI. REVIEW OF SYSTEMS    (2-9 systems for level 4, 10 or more for level 5)     Review of Systems   Constitutional: Negative for fatigue and fever. HENT: Negative. Eyes: Negative for visual disturbance. Respiratory: Negative for shortness of breath. Cardiovascular: Negative for chest pain. Gastrointestinal: Negative for abdominal pain, nausea and vomiting. Genitourinary: Negative.          Vaginal foreign body   Musculoskeletal: Negative. Skin: Negative. Neurological: Negative. Positives and Pertinent negatives as per HPI. Except as noted above in the ROS, all other systems were reviewed and negative. PAST MEDICAL HISTORY     Past Medical History:   Diagnosis Date    Headache          SURGICAL HISTORY     Past Surgical History:   Procedure Laterality Date     SECTION           [address]       Discharge Medication List as of 2021  3:03 AM            ALLERGIES     Patient has no known allergies. FAMILYHISTORY     History reviewed. No pertinent family history. SOCIAL HISTORY       Social History     Tobacco Use    Smoking status: Never Smoker    Smokeless tobacco: Never Used   Vaping Use    Vaping Use: Never used   Substance Use Topics    Alcohol use: Yes     Comment: socially    Drug use: No       SCREENINGS             PHYSICAL EXAM    (up to 7 for level 4, 8 or more for level 5)     ED Triage Vitals [21 0143]   BP Temp Temp Source Pulse Resp SpO2 Height Weight   117/82 98.5 °F (36.9 °C) Oral 89 16 99 % 5' 7\" (1.702 m) 215 lb (97.5 kg)       Physical Exam  Vitals and nursing note reviewed. Exam conducted with a chaperone present. Constitutional:       General: She is not in acute distress. Appearance: Normal appearance. She is well-developed. She is not ill-appearing, toxic-appearing or diaphoretic. HENT:      Head: Normocephalic and atraumatic. Nose: Nose normal.   Eyes:      General:         Right eye: No discharge. Left eye: No discharge. Genitourinary:     Exam position: Supine. Vagina: Foreign body present. Musculoskeletal:         General: Normal range of motion. Cervical back: Normal range of motion and neck supple. Skin:     General: Skin is warm and dry. Coloration: Skin is not pale. Neurological:      Mental Status: She is alert and oriented to person, place, and time.    Psychiatric: Behavior: Behavior normal.         DIAGNOSTIC RESULTS   LABS:    Labs Reviewed - No data to display    When ordered only abnormal lab results are displayed. All other labs were within normal range or not returned as of this dictation. EKG: When ordered, EKG's are interpreted by the Emergency Department Physician in the absence of a cardiologist.  Please see their note for interpretation of EKG. RADIOLOGY:   Non-plain film images such as CT, Ultrasound and MRI are read by the radiologist. Plain radiographic images are visualized and preliminarily interpreted by the ED Provider with the below findings:        Interpretation per the Radiologist below, if available at the time of this note:    No orders to display     No results found. PROCEDURES   Unless otherwise noted below, none     Foreign Body    Date/Time: 8/6/2021 3:41 AM  Performed by: Shmuel Soto PA-C  Authorized by: Shmuel Soto PA-C     Location:     Location:  Anogenital    Anogenital location: vaginal.  Pre-procedure details:     Imaging:  None  Anesthesia (see MAR for exact dosages): Anesthesia method:  None  Procedure type:     Procedure complexity:  Simple  Procedure details:     Removal mechanism: Forceps    Foreign bodies recovered:  1    Description:  Condom    Intact foreign body removal: yes    Post-procedure details:     Confirmation:  No additional foreign bodies on visualization    Patient tolerance of procedure:   Tolerated well, no immediate complications        CRITICAL CARE TIME   N/A    CONSULTS:  None      EMERGENCY DEPARTMENT COURSE and DIFFERENTIAL DIAGNOSIS/MDM:   Vitals:    Vitals:    08/06/21 0143   BP: 117/82   Pulse: 89   Resp: 16   Temp: 98.5 °F (36.9 °C)   TempSrc: Oral   SpO2: 99%   Weight: 215 lb (97.5 kg)   Height: 5' 7\" (1.702 m)       Patient was given the following medications:  Medications - No data to display        Patient presents emergency department for evaluation of possible retained foreign body in her vagina. Patient had intercourse and the condom was unable to be found. On speculum examination condom was easily visible and was easily obtained with forceps. Condom appeared intact. Bimanual exam was negative for any additional retained foreign bodies, lacerations or other injuries. Patient will be discharged home with no new prescriptions. Encouraged to follow-up with OB/GYN as needed. Patient is using oral birth control for backup pregnancy prevention. At this time I have low concern for retained foreign body, vaginal laceration, perforation, bowel injury or other acute process that require management. FINAL IMPRESSION      1.  Vaginal foreign body, initial encounter          DISPOSITION/PLAN   DISPOSITION Decision To Discharge 08/06/2021 03:01:45 AM      PATIENT REFERRED TO:  [facility] Emergency Department  [address] [place] [address]  [phone]    If symptoms worsen      DISCHARGE MEDICATIONS:  Discharge Medication List as of 8/6/2021  3:03 AM          DISCONTINUED MEDICATIONS:  Discharge Medication List as of 8/6/2021  3:03 AM      STOP taking these medications       topiramate (TOPAMAX) 100 MG tablet Comments:   Reason for Stopping:         butalbital-acetaminophen-caffeine (FIORICET, ESGIC) -40 MG per tablet Comments:   Reason for Stopping:         ibuprofen (IBU) 600 MG tablet Comments:   Reason for Stopping:         Cranberry-Vitamin C (AZO CRANBERRY URINARY TRACT PO) Comments:   Reason for Stopping:                      (Please note that portions of this note were completed with a voice recognition program.  Efforts were made to edit the dictations but occasionally words are mis-transcribed.)    Courtney Joiner PA-C (electronically signed)            Courtney Joiner PA-C  08/06/21 3617

## 2022-08-06 ENCOUNTER — APPOINTMENT (OUTPATIENT)
Dept: ULTRASOUND IMAGING | Age: 27
End: 2022-08-06
Payer: COMMERCIAL

## 2022-08-06 ENCOUNTER — HOSPITAL ENCOUNTER (EMERGENCY)
Age: 27
Discharge: HOME | End: 2022-08-06
Attending: EMERGENCY MEDICINE
Payer: COMMERCIAL

## 2022-08-06 VITALS
BODY MASS INDEX: 33.53 KG/M2 | OXYGEN SATURATION: 99 % | RESPIRATION RATE: 16 BRPM | WEIGHT: 214.1 LBS | HEART RATE: 88 BPM | SYSTOLIC BLOOD PRESSURE: 147 MMHG | TEMPERATURE: 98.2 F | DIASTOLIC BLOOD PRESSURE: 89 MMHG

## 2022-08-06 DIAGNOSIS — O26.899 ABDOMINAL PAIN DURING PREGNANCY, ANTEPARTUM: Primary | ICD-10-CM

## 2022-08-06 DIAGNOSIS — R10.9 ABDOMINAL PAIN DURING PREGNANCY, ANTEPARTUM: Primary | ICD-10-CM

## 2022-08-06 LAB
A/G RATIO: 1.6 (ref 1.1–2.2)
ABO/RH: NORMAL
ALBUMIN SERPL-MCNC: 4.5 G/DL (ref 3.4–5)
ALP BLD-CCNC: 79 U/L (ref 40–129)
ALT SERPL-CCNC: 21 U/L (ref 10–40)
ANION GAP SERPL CALCULATED.3IONS-SCNC: 10 MMOL/L (ref 3–16)
AST SERPL-CCNC: 17 U/L (ref 15–37)
ATYPICAL LYMPHOCYTE RELATIVE PERCENT: 2 % (ref 0–6)
BANDED NEUTROPHILS RELATIVE PERCENT: 1 % (ref 0–7)
BASOPHILS ABSOLUTE: 0.1 K/UL (ref 0–0.2)
BASOPHILS RELATIVE PERCENT: 1 %
BILIRUB SERPL-MCNC: 0.3 MG/DL (ref 0–1)
BILIRUBIN URINE: NEGATIVE
BLOOD, URINE: NEGATIVE
BUN BLDV-MCNC: 9 MG/DL (ref 7–20)
CALCIUM SERPL-MCNC: 9.6 MG/DL (ref 8.3–10.6)
CHLORIDE BLD-SCNC: 106 MMOL/L (ref 99–110)
CLARITY: CLEAR
CO2: 23 MMOL/L (ref 21–32)
COLOR: YELLOW
CREAT SERPL-MCNC: 0.7 MG/DL (ref 0.6–1.1)
EOSINOPHILS ABSOLUTE: 0 K/UL (ref 0–0.6)
EOSINOPHILS RELATIVE PERCENT: 0 %
GFR AFRICAN AMERICAN: >60
GFR NON-AFRICAN AMERICAN: >60
GLUCOSE BLD-MCNC: 105 MG/DL (ref 70–99)
GLUCOSE URINE: NEGATIVE MG/DL
GONADOTROPIN, CHORIONIC (HCG) QUANT: NORMAL MIU/ML
HCT VFR BLD CALC: 42.1 % (ref 36–48)
HEMOGLOBIN: 13.5 G/DL (ref 12–16)
KETONES, URINE: ABNORMAL MG/DL
LEUKOCYTE ESTERASE, URINE: NEGATIVE
LYMPHOCYTES ABSOLUTE: 2.3 K/UL (ref 1–5.1)
LYMPHOCYTES RELATIVE PERCENT: 30 %
MACROCYTES: ABNORMAL
MCH RBC QN AUTO: 27.5 PG (ref 26–34)
MCHC RBC AUTO-ENTMCNC: 32.1 G/DL (ref 31–36)
MCV RBC AUTO: 85.8 FL (ref 80–100)
MICROSCOPIC EXAMINATION: ABNORMAL
MONOCYTES ABSOLUTE: 0.6 K/UL (ref 0–1.3)
MONOCYTES RELATIVE PERCENT: 9 %
NEUTROPHILS ABSOLUTE: 4.2 K/UL (ref 1.7–7.7)
NEUTROPHILS RELATIVE PERCENT: 57 %
NITRITE, URINE: NEGATIVE
OVALOCYTES: ABNORMAL
PDW BLD-RTO: 14 % (ref 12.4–15.4)
PH UA: 6 (ref 5–8)
PLATELET # BLD: 299 K/UL (ref 135–450)
PLATELET SLIDE REVIEW: ADEQUATE
PMV BLD AUTO: 8.8 FL (ref 5–10.5)
POTASSIUM REFLEX MAGNESIUM: 4 MMOL/L (ref 3.5–5.1)
PROTEIN UA: NEGATIVE MG/DL
RBC # BLD: 4.91 M/UL (ref 4–5.2)
SLIDE REVIEW: ABNORMAL
SODIUM BLD-SCNC: 139 MMOL/L (ref 136–145)
SPECIFIC GRAVITY UA: 1.02 (ref 1–1.03)
TEAR DROP CELLS: ABNORMAL
TOTAL PROTEIN: 7.4 G/DL (ref 6.4–8.2)
URINE REFLEX TO CULTURE: ABNORMAL
URINE TYPE: ABNORMAL
UROBILINOGEN, URINE: 1 E.U./DL
WBC # BLD: 7.2 K/UL (ref 4–11)

## 2022-08-06 PROCEDURE — 80053 COMPREHEN METABOLIC PANEL: CPT

## 2022-08-06 PROCEDURE — 99284 EMERGENCY DEPT VISIT MOD MDM: CPT

## 2022-08-06 PROCEDURE — 85025 COMPLETE CBC W/AUTO DIFF WBC: CPT

## 2022-08-06 PROCEDURE — 86901 BLOOD TYPING SEROLOGIC RH(D): CPT

## 2022-08-06 PROCEDURE — 6370000000 HC RX 637 (ALT 250 FOR IP): Performed by: PHYSICIAN ASSISTANT

## 2022-08-06 PROCEDURE — 81003 URINALYSIS AUTO W/O SCOPE: CPT

## 2022-08-06 PROCEDURE — 86900 BLOOD TYPING SEROLOGIC ABO: CPT

## 2022-08-06 PROCEDURE — 84702 CHORIONIC GONADOTROPIN TEST: CPT

## 2022-08-06 PROCEDURE — 76817 TRANSVAGINAL US OBSTETRIC: CPT

## 2022-08-06 RX ORDER — ACETAMINOPHEN 500 MG
1000 TABLET ORAL ONCE
Status: COMPLETED | OUTPATIENT
Start: 2022-08-06 | End: 2022-08-06

## 2022-08-06 RX ORDER — VITAMIN A ACETATE, BETA CAROTENE, ASCORBIC ACID, CHOLECALCIFEROL, .ALPHA.-TOCOPHEROL ACETATE, DL-, THIAMINE MONONITRATE, RIBOFLAVIN, NIACINAMIDE, PYRIDOXINE HYDROCHLORIDE, FOLIC ACID, CYANOCOBALAMIN, CALCIUM CARBONATE, FERROUS FUMARATE, ZINC OXIDE, CUPRIC OXIDE 3080; 12; 120; 400; 1; 1.84; 3; 20; 22; 920; 25; 200; 27; 10; 2 [IU]/1; UG/1; MG/1; [IU]/1; MG/1; MG/1; MG/1; MG/1; MG/1; [IU]/1; MG/1; MG/1; MG/1; MG/1; MG/1
1 TABLET, FILM COATED ORAL DAILY
Qty: 30 TABLET | Refills: 0 | Status: SHIPPED | OUTPATIENT
Start: 2022-08-06

## 2022-08-06 RX ADMIN — ACETAMINOPHEN 1000 MG: 500 TABLET ORAL at 14:10

## 2022-08-06 ASSESSMENT — ENCOUNTER SYMPTOMS
VOMITING: 0
CHEST TIGHTNESS: 0
RESPIRATORY NEGATIVE: 1
SHORTNESS OF BREATH: 0
CONSTIPATION: 0
BACK PAIN: 0
WHEEZING: 0
STRIDOR: 0
COLOR CHANGE: 0
COUGH: 0
NAUSEA: 0
DIARRHEA: 0
ABDOMINAL PAIN: 1

## 2022-08-06 ASSESSMENT — PAIN SCALES - GENERAL: PAINLEVEL_OUTOF10: 0

## 2022-08-06 ASSESSMENT — PAIN - FUNCTIONAL ASSESSMENT: PAIN_FUNCTIONAL_ASSESSMENT: NONE - DENIES PAIN

## 2022-08-06 NOTE — ED PROVIDER NOTES
[address] [facility]        Pt Name: Jillian Smith  MRN: [id number]  [place] 1995  Date of evaluation: 2022  Provider: ARLETTE Vergara  PCP: Kymberly Gerber MD  Note Started: 3:19 PM EDT        I have seen and evaluated this patient with my supervising physician Rodney Jasso. CHIEF COMPLAINT       Chief Complaint   Patient presents with    Abdominal Cramping     Pt reports abdominal cramping, states she was told this is normal but wants second opinion, LMP , sees [facility]        HISTORY OF PRESENT ILLNESS   (Location, Timing/Onset, Context/Setting, Quality, Duration, Modifying Factors, Severity, Associated Signs and Symptoms)  Note limiting factors. Chief Complaint: Abd Pain     Jillian Smith is a 33 y.o. female with no significant past medical history who presents to the ED with complaint of abdominal pain. Patient states she is currently pregnant. She is unsure how far along she is. Is scheduled to follow-up with [facility] but has not seen them for this pregnancy. States her last menstrual cycle was on 2022. She states she is  Ab0. Patient states she has had some lower abdominal cramping for the past several days. States she called the office and they told her that it could be normal but if she was concerned to go to the emergency department. She came to the ED for further evaluation and treatment. She has any vaginal bleeding or discharge. Denies dysuria, frequency, urgency or hematuria. Denies changes in bowel movements. Denies nausea vomiting. Denies chest pain or shortness of breath. She is not currently on prenatals. Nursing Notes were all reviewed and agreed with or any disagreements were addressed in the HPI.     REVIEW OF SYSTEMS    (2-9 systems for level 4, 10 or more for level 5)     Review of Systems   Constitutional:  Negative for activity change, appetite change, chills, diaphoresis, fatigue and fever. Respiratory: Negative. Negative for cough, chest tightness, shortness of breath, wheezing and stridor. Cardiovascular: Negative. Negative for chest pain, palpitations and leg swelling. Gastrointestinal:  Positive for abdominal pain. Negative for constipation, diarrhea, nausea and vomiting. Genitourinary:  Negative for decreased urine volume, difficulty urinating, dysuria, flank pain, frequency, hematuria, pelvic pain, urgency, vaginal bleeding, vaginal discharge and vaginal pain. Musculoskeletal:  Negative for arthralgias, back pain, myalgias, neck pain and neck stiffness. Skin:  Negative for color change, pallor, rash and wound. Neurological:  Negative for dizziness, light-headedness and headaches. Positives and Pertinent negatives as per HPI. Except as noted above in the ROS, all other systems were reviewed and negative. PAST MEDICAL HISTORY     Past Medical History:   Diagnosis Date    Headache          SURGICAL HISTORY     Past Surgical History:   Procedure Laterality Date     SECTION           CURRENTMEDICATIONS       Discharge Medication List as of 2022  2:37 PM            ALLERGIES     Patient has no known allergies. FAMILYHISTORY     History reviewed. No pertinent family history.        SOCIAL HISTORY       Social History     Tobacco Use    Smoking status: Never    Smokeless tobacco: Never   Vaping Use    Vaping Use: Never used   Substance Use Topics    Alcohol use: Yes     Comment: socially    Drug use: No       SCREENINGS    [place] Coma Scale  Eye Opening: Spontaneous  Best Verbal Response: Oriented  Best Motor Response: Obeys commands  [place] Coma Scale Score: 15        PHYSICAL EXAM    (up to 7 for level 4, 8 or more for level 5)     ED Triage Vitals [22 1129]   BP Temp Temp src Heart Rate Resp SpO2 Height Weight   (!) 147/89 98.2 °F (36.8 °C) -- 88 16 99 % -- 214 lb 1.6 oz (97.1 kg)       Physical Exam  Constitutional: General: She is not in acute distress. Appearance: Normal appearance. She is well-developed. She is not ill-appearing, toxic-appearing or diaphoretic. HENT:      Head: Normocephalic and atraumatic. Right Ear: External ear normal.      Left Ear: External ear normal.   Eyes:      General:         Right eye: No discharge. Left eye: No discharge. Cardiovascular:      Rate and Rhythm: Normal rate and regular rhythm. Pulses: Normal pulses. Heart sounds: Normal heart sounds. No murmur heard. No friction rub. No gallop. Comments: 2+ radial pulses bilaterally. Pulmonary:      Effort: Pulmonary effort is normal. No respiratory distress. Breath sounds: Normal breath sounds. No stridor. No wheezing, rhonchi or rales. Chest:      Chest wall: No tenderness. Abdominal:      General: Abdomen is flat. Bowel sounds are normal. There is no distension. Palpations: Abdomen is soft. There is no mass. Tenderness: There is abdominal tenderness in the suprapubic area. There is no right CVA tenderness, left CVA tenderness, guarding or rebound. Negative signs include Finch's sign, Rovsing's sign and McBurney's sign. Hernia: No hernia is present. Musculoskeletal:         General: Normal range of motion. Cervical back: Normal range of motion and neck supple. Skin:     General: Skin is warm and dry. Coloration: Skin is not pale. Findings: No erythema. Neurological:      Mental Status: She is alert and oriented to person, place, and time.    Psychiatric:         Behavior: Behavior normal.       DIAGNOSTIC RESULTS   LABS:    Labs Reviewed   URINALYSIS WITH REFLEX TO CULTURE - Abnormal; Notable for the following components:       Result Value    Ketones, Urine TRACE (*)     All other components within normal limits   CBC WITH AUTO DIFFERENTIAL - Abnormal; Notable for the following components:    Macrocytes Occasional (*)     Ovalocytes Occasional (*)     Tear Drop Cells Occasional (*)     All other components within normal limits   COMPREHENSIVE METABOLIC PANEL W/ REFLEX TO MG FOR LOW K - Abnormal; Notable for the following components:    Glucose 105 (*)     All other components within normal limits   HCG, QUANTITATIVE, PREGNANCY   ABO/RH       When ordered only abnormal lab results are displayed. All other labs were within normal range or not returned as of this dictation. EKG: When ordered, EKG's are interpreted by the Emergency Department Physician in the absence of a cardiologist.  Please see their note for interpretation of EKG. RADIOLOGY:   Non-plain film images such as CT, Ultrasound and MRI are read by the radiologist. Plain radiographic images are visualized and preliminarily interpreted by the ED Provider with the below findings:        Interpretation per the Radiologist below, if available at the time of this note:    US OB TRANSVAGINAL   Final Result   An intrauterine gestational sac is seen containing a yolk sac, though without   fetal pole. By ultrasound, this is estimated at 6 weeks, 1 day. A fetal   pole would be anticipated in a gestational sac of this size. Some   considerations include blighted ovum, sequela of spontaneous , or   early intrauterine pregnancy with an atypically large gestational sac. Continued beta HCG, clinical, and ultrasound follow-up recommended. Complex right ovarian cystic lesion with peripheral flow, probably corpus   luteal cyst.  Follow-up to resolution recommended. US OB TRANSVAGINAL    Result Date: 2022  EXAMINATION: FIRST TRIMESTER OBSTETRIC ULTRASOUND 2022 TECHNIQUE: Transvaginal first trimester obstetric pelvic ultrasound was performed with color Doppler flow evaluation.  COMPARISON: None HISTORY: ORDERING SYSTEM PROVIDED HISTORY: Pregnant and Pelvic Pain TECHNOLOGIST PROVIDED HISTORY: Reason for exam:->Pregnant and Pelvic Pain FINDINGS: Uterus: 9.3 x 5.8 x 4.8 cm Gestational Sac(s): Single intrauterine gestational sac, measuring 1.6 x 1.0 x 1.4 cm. A small amount of hypoechoic fluid is seen adjacent to the sac. Yolk Sac:  Present Fetal Pole:  Not visualized Cervix is approximately 3.6 cm in length. Right ovary: 3.2 x 3.0 x 2.6 cm. A complex cystic lesion with mural nodule and internal septation echogenic rim is seen within the right ovary with peripheral flow, measuring approximately 1.7 x 1.3 cm. Flow was seen within the right ovary. Left ovary: 2.5 x 1.8 x 2.2 cm. Measurements: Estimated gestational age by current ultrasound: 6 weeks, 1 day Estimated gestational by LMP/prior ultrasound: 5 weeks, 2 days Estimated Due Date: 2023     An intrauterine gestational sac is seen containing a yolk sac, though without fetal pole. By ultrasound, this is estimated at 6 weeks, 1 day. A fetal pole would be anticipated in a gestational sac of this size. Some considerations include blighted ovum, sequela of spontaneous , or early intrauterine pregnancy with an atypically large gestational sac. Continued beta HCG, clinical, and ultrasound follow-up recommended. Complex right ovarian cystic lesion with peripheral flow, probably corpus luteal cyst.  Follow-up to resolution recommended. PROCEDURES   Unless otherwise noted below, none     Procedures    CRITICAL CARE TIME       CONSULTS:  IP CONSULT TO OB GYN      EMERGENCY DEPARTMENT COURSE and DIFFERENTIAL DIAGNOSIS/MDM:   Vitals:    Vitals:    22 1129   BP: (!) 147/89   Pulse: 88   Resp: 16   Temp: 98.2 °F (36.8 °C)   SpO2: 99%   Weight: 214 lb 1.6 oz (97.1 kg)       Patient was given the following medications:  Medications   acetaminophen (TYLENOL) tablet 1,000 mg (1,000 mg Oral Given 22 1410)         Is this patient to be included in the SEP-1 Core Measure due to severe sepsis or septic shock? No   Exclusion criteria - the patient is NOT to be included for SEP-1 Core Measure due to:   Infection is not suspected    Patient is a 78-year-old female who presents to the ED with complaint of abdominal pain and pregnancy. Patient denies any vaginal bleeding or discharge. Afebrile stable vital signs. Nontoxic appearance. Urinalysis showed trace ketones but no significant signs of infection. CBC showed a white count, hemoglobin and platelets. CMP relatively unremarkable with normal LFTs. Blood type is a positive. Hg quant 15,000. Given patient's complaint of lower abdominal pain and pregnancy with no documented prenatal care thus far ultrasound was ordered. Ultrasound showed intrauterine gestational sac seen containing a yolk sac but no fetal pole. Ultrasound estimated this at 6 weeks and 1 day. Fetal pole would be anticipated given size of gestational sac per radiology read. Radiologist concern for potential blighted ovum versus sequela from spontaneous miscarriage or early intrauterine pregnancy with a typically large gestational sac. Complex right ovarian cystic lesion noted with peripheral flow probably corpus luteum cyst.  She has no tenderness with patient to the right lower quadrant and low sufficient for ectopic pregnancy. Believe patient suffering from abdominal pain in early pregnancy with concern for early pregnancy versus blighted ovum. She has had no vaginal bleeding and do not believe this represents sequela from previous miscarriage at this time. Case discussed with on-call OB/GYN, Dr. Yoly Payne, who felt could be discharged home with close outpatient follow-up by OB/GYN. Recommended 48 to 72-hour hCG quant recheck. She was ordered 48 to 72-hour hCG quant recheck. Ordered prenatals. Close outpatient follow-up with OB/GYN. Close return precautions discussed. Low suspicion for Rh compatibility, help syndrome, active miscarriage, ectopic pregnancy, surgical abdomen, pyelonephritis nephrolithiasis or other emergent etiology at this time. FINAL IMPRESSION      1.  Abdominal pain during pregnancy, antepartum          DISPOSITION/PLAN   DISPOSITION Decision To Discharge 08/06/2022 02:26:29 PM      PATIENT REFERRED TO:  Your OB/GYN    Schedule an appointment as soon as possible for a visit   For a Re-check in  3-5 days.       DISCHARGE MEDICATIONS:  Discharge Medication List as of 8/6/2022  2:37 PM        START taking these medications    Details   Prenatal Vit-Fe Fumarate-FA (PRENATAL PLUS) 27-1 MG TABS Take 1 tablet by mouth in the morning., Disp-30 tablet, R-0Print             DISCONTINUED MEDICATIONS:  Discharge Medication List as of 8/6/2022  2:37 PM        STOP taking these medications       topiramate (TOPAMAX) 100 MG tablet Comments:   Reason for Stopping:                      (Please note that portions of this note were completed with a voice recognition program.  Efforts were made to edit the dictations but occasionally words are mis-transcribed.)    ARLETTE Joshi (electronically signed)          ARLETTE Bailey  08/06/22 6785 [facility] ARLETTE Cannon  08/06/22 2421

## 2022-08-06 NOTE — ED PROVIDER NOTES
[facility] Emergency Department      Pt Name: Edy Paulson  MRN: [id number]  [place] 1995  Date of evaluation: 2022  Provider: Mehreen Hernandez MD  I independently performed a history and physical on Edy Paulson. All diagnostic, treatment, and disposition decisions were made by myself in conjunction with the advanced practice provider. HPI: Edy Paulson presented with   Chief Complaint   Patient presents with    Abdominal Cramping     Pt reports abdominal cramping, states she was told this is normal but wants second opinion, LMP , sees [facility]      Edy Paulson has a past medical history of Headache. She has a past surgical history that includes  section. No current facility-administered medications on file prior to encounter. Current Outpatient Medications on File Prior to Encounter   Medication Sig Dispense Refill    [DISCONTINUED] topiramate (TOPAMAX) 100 MG tablet Take 100 mg by mouth daily       PHYSICAL EXAM  Vitals: BP (!) 147/89   Pulse 88   Temp 98.2 °F (36.8 °C)   Resp 16   Wt 214 lb 1.6 oz (97.1 kg)   LMP 2022   SpO2 99%   BMI 33.53 kg/m²   Constitutional:  33 y.o. female alert  HENT:  Atraumatic, oral mucosa moist  Neck:  No visible JVD, supple  Chest/Lungs:  Respiratory effort normal  Abdomen:  Non-distended, soft, NT  Back:  No gross deformity  Extremities:  Normal tone and perfusion    Medical Decision Making and Plan: Briefly, this is an 33 y. o.female who presented with concern for abdominal pain in early pregnancy. Ultrasound today has abnormality. She is clinically well in appearance and we feel she can go home to continue outpatient care for her symptoms. She will need serial HCG levels and ultrasounds, to be arranged by her obstetrician.   We doubt she has an alternative diagnosis such as appendicitis, obstruction, cholangitis, perforation, torsion, pyelonephritis, etc.  Edy Paulson was given appropriate discharge instructions. Referral to follow up provider. For further details of [facility] encounter, please see documentation by advanced practice provider ARLETTE Francisco. Labs Reviewed   URINALYSIS WITH REFLEX TO CULTURE - Abnormal; Notable for the following components:       Result Value    Ketones, Urine TRACE (*)     All other components within normal limits   CBC WITH AUTO DIFFERENTIAL - Abnormal; Notable for the following components:    Macrocytes Occasional (*)     Ovalocytes Occasional (*)     Tear Drop Cells Occasional (*)     All other components within normal limits   COMPREHENSIVE METABOLIC PANEL W/ REFLEX TO MG FOR LOW K - Abnormal; Notable for the following components:    Glucose 105 (*)     All other components within normal limits   HCG, QUANTITATIVE, PREGNANCY   ABO/RH     RADIOLOGY:     US OB TRANSVAGINAL   Final Result   An intrauterine gestational sac is seen containing a yolk sac, though without   fetal pole. By ultrasound, this is estimated at 6 weeks, 1 day. A fetal   pole would be anticipated in a gestational sac of this size. Some   considerations include blighted ovum, sequela of spontaneous , or   early intrauterine pregnancy with an atypically large gestational sac. Continued beta HCG, clinical, and ultrasound follow-up recommended. Complex right ovarian cystic lesion with peripheral flow, probably corpus   luteal cyst.  Follow-up to resolution recommended.               Silvestre Powell MD  22 2019

## 2023-12-20 PROBLEM — H20.9 TRAUMATIC IRITIS: Status: ACTIVE | Noted: 2023-12-20

## 2023-12-20 PROBLEM — S05.01XA RIGHT CORNEAL ABRASION: Status: ACTIVE | Noted: 2023-12-20

## 2023-12-20 PROBLEM — R03.0 ELEVATED BLOOD PRESSURE READING: Status: ACTIVE | Noted: 2023-12-20

## 2024-09-14 ENCOUNTER — HOSPITAL ENCOUNTER (EMERGENCY)
Age: 29
Discharge: HOME | End: 2024-09-14
Attending: EMERGENCY MEDICINE
Payer: COMMERCIAL

## 2024-09-14 VITALS
HEART RATE: 94 BPM | BODY MASS INDEX: 36.47 KG/M2 | WEIGHT: 232.37 LBS | HEIGHT: 67 IN | DIASTOLIC BLOOD PRESSURE: 78 MMHG | OXYGEN SATURATION: 100 % | SYSTOLIC BLOOD PRESSURE: 149 MMHG | RESPIRATION RATE: 16 BRPM | TEMPERATURE: 98.8 F

## 2024-09-14 DIAGNOSIS — G43.909 MIGRAINE WITHOUT STATUS MIGRAINOSUS, NOT INTRACTABLE, UNSPECIFIED MIGRAINE TYPE: Primary | ICD-10-CM

## 2024-09-14 PROCEDURE — 96375 TX/PRO/DX INJ NEW DRUG ADDON: CPT

## 2024-09-14 PROCEDURE — 2580000003 HC RX 258: Performed by: EMERGENCY MEDICINE

## 2024-09-14 PROCEDURE — 99284 EMERGENCY DEPT VISIT MOD MDM: CPT

## 2024-09-14 PROCEDURE — 96374 THER/PROPH/DIAG INJ IV PUSH: CPT

## 2024-09-14 PROCEDURE — 6360000002 HC RX W HCPCS: Performed by: EMERGENCY MEDICINE

## 2024-09-14 RX ORDER — 0.9 % SODIUM CHLORIDE 0.9 %
1000 INTRAVENOUS SOLUTION INTRAVENOUS ONCE
Status: COMPLETED | OUTPATIENT
Start: 2024-09-14 | End: 2024-09-14

## 2024-09-14 RX ORDER — KETOROLAC TROMETHAMINE 15 MG/ML
15 INJECTION, SOLUTION INTRAMUSCULAR; INTRAVENOUS ONCE
Status: COMPLETED | OUTPATIENT
Start: 2024-09-14 | End: 2024-09-14

## 2024-09-14 RX ORDER — PROCHLORPERAZINE EDISYLATE 5 MG/ML
10 INJECTION INTRAMUSCULAR; INTRAVENOUS ONCE
Status: COMPLETED | OUTPATIENT
Start: 2024-09-14 | End: 2024-09-14

## 2024-09-14 RX ORDER — SUMATRIPTAN 50 MG/1
50 TABLET, FILM COATED ORAL DAILY PRN
COMMUNITY
Start: 2024-09-10

## 2024-09-14 RX ORDER — ERGOCALCIFEROL 1.25 MG/1
CAPSULE, LIQUID FILLED ORAL
COMMUNITY
Start: 2024-09-11

## 2024-09-14 RX ORDER — BUTALBITAL, ACETAMINOPHEN AND CAFFEINE 50; 325; 40 MG/1; MG/1; MG/1
TABLET ORAL
COMMUNITY
Start: 2024-09-12

## 2024-09-14 RX ORDER — PROCHLORPERAZINE MALEATE 5 MG
5 TABLET ORAL EVERY 6 HOURS PRN
Qty: 20 TABLET | Refills: 0 | Status: SHIPPED | OUTPATIENT
Start: 2024-09-14 | End: 2024-09-19

## 2024-09-14 RX ORDER — DIPHENHYDRAMINE HYDROCHLORIDE 50 MG/ML
10 INJECTION INTRAMUSCULAR; INTRAVENOUS ONCE
Status: COMPLETED | OUTPATIENT
Start: 2024-09-14 | End: 2024-09-14

## 2024-09-14 RX ORDER — IBUPROFEN 200 MG
800 TABLET ORAL EVERY 8 HOURS PRN
COMMUNITY

## 2024-09-14 RX ADMIN — PROCHLORPERAZINE EDISYLATE 10 MG: 5 INJECTION INTRAMUSCULAR; INTRAVENOUS at 16:13

## 2024-09-14 RX ADMIN — SODIUM CHLORIDE 1000 ML: 9 INJECTION, SOLUTION INTRAVENOUS at 16:12

## 2024-09-14 RX ADMIN — DIPHENHYDRAMINE HYDROCHLORIDE 10 MG: 50 INJECTION INTRAMUSCULAR; INTRAVENOUS at 16:13

## 2024-09-14 RX ADMIN — KETOROLAC TROMETHAMINE 15 MG: 15 INJECTION INTRAMUSCULAR at 16:14

## 2024-09-14 ASSESSMENT — PAIN - FUNCTIONAL ASSESSMENT
PAIN_FUNCTIONAL_ASSESSMENT: 0-10
PAIN_FUNCTIONAL_ASSESSMENT: NONE - DENIES PAIN
PAIN_FUNCTIONAL_ASSESSMENT: NONE - DENIES PAIN

## 2024-09-14 ASSESSMENT — PAIN DESCRIPTION - FREQUENCY: FREQUENCY: CONTINUOUS

## 2024-09-14 ASSESSMENT — PAIN DESCRIPTION - PAIN TYPE: TYPE: ACUTE PAIN

## 2024-09-14 ASSESSMENT — PAIN SCALES - GENERAL: PAINLEVEL_OUTOF10: 7

## 2024-09-14 ASSESSMENT — PAIN DESCRIPTION - LOCATION: LOCATION: HEAD

## 2024-09-14 ASSESSMENT — PAIN DESCRIPTION - DESCRIPTORS: DESCRIPTORS: ACHING
